# Patient Record
Sex: FEMALE | Race: ASIAN | NOT HISPANIC OR LATINO | URBAN - METROPOLITAN AREA
[De-identification: names, ages, dates, MRNs, and addresses within clinical notes are randomized per-mention and may not be internally consistent; named-entity substitution may affect disease eponyms.]

---

## 2019-12-04 ENCOUNTER — INPATIENT (INPATIENT)
Facility: HOSPITAL | Age: 39
LOS: 0 days | Discharge: HOME | DRG: 832 | End: 2019-12-05
Attending: OBSTETRICS & GYNECOLOGY | Admitting: OBSTETRICS & GYNECOLOGY
Payer: COMMERCIAL

## 2019-12-04 VITALS — WEIGHT: 293 LBS | HEIGHT: 64 IN

## 2019-12-04 DIAGNOSIS — Z3A.00 WEEKS OF GESTATION OF PREGNANCY NOT SPECIFIED: ICD-10-CM

## 2019-12-04 DIAGNOSIS — O26.899 OTHER SPECIFIED PREGNANCY RELATED CONDITIONS, UNSPECIFIED TRIMESTER: ICD-10-CM

## 2019-12-04 DIAGNOSIS — Z34.80 ENCOUNTER FOR SUPERVISION OF OTHER NORMAL PREGNANCY, UNSPECIFIED TRIMESTER: ICD-10-CM

## 2019-12-04 LAB
BASOPHILS # BLD AUTO: 0.03 K/UL — SIGNIFICANT CHANGE UP (ref 0–0.2)
BASOPHILS NFR BLD AUTO: 0.4 % — SIGNIFICANT CHANGE UP (ref 0–2)
BLD GP AB SCN SERPL QL: NEGATIVE — SIGNIFICANT CHANGE UP
EOSINOPHIL # BLD AUTO: 0.07 K/UL — SIGNIFICANT CHANGE UP (ref 0–0.5)
EOSINOPHIL NFR BLD AUTO: 1 % — SIGNIFICANT CHANGE UP (ref 0–6)
HCT VFR BLD CALC: 38.5 % — SIGNIFICANT CHANGE UP (ref 34.5–45)
HGB BLD-MCNC: 13.5 G/DL — SIGNIFICANT CHANGE UP (ref 11.5–15.5)
IMM GRANULOCYTES NFR BLD AUTO: 1.9 % — HIGH (ref 0–1.5)
LYMPHOCYTES # BLD AUTO: 1.59 K/UL — SIGNIFICANT CHANGE UP (ref 1–3.3)
LYMPHOCYTES # BLD AUTO: 21.9 % — SIGNIFICANT CHANGE UP (ref 13–44)
MCHC RBC-ENTMCNC: 30.3 PG — SIGNIFICANT CHANGE UP (ref 27–34)
MCHC RBC-ENTMCNC: 35.1 GM/DL — SIGNIFICANT CHANGE UP (ref 32–36)
MCV RBC AUTO: 86.5 FL — SIGNIFICANT CHANGE UP (ref 80–100)
MONOCYTES # BLD AUTO: 0.86 K/UL — SIGNIFICANT CHANGE UP (ref 0–0.9)
MONOCYTES NFR BLD AUTO: 11.9 % — SIGNIFICANT CHANGE UP (ref 2–14)
NEUTROPHILS # BLD AUTO: 4.56 K/UL — SIGNIFICANT CHANGE UP (ref 1.8–7.4)
NEUTROPHILS NFR BLD AUTO: 62.9 % — SIGNIFICANT CHANGE UP (ref 43–77)
NRBC # BLD: 0 /100 WBCS — SIGNIFICANT CHANGE UP (ref 0–0)
PLATELET # BLD AUTO: 111 K/UL — LOW (ref 150–400)
RBC # BLD: 4.45 M/UL — SIGNIFICANT CHANGE UP (ref 3.8–5.2)
RBC # FLD: 13.6 % — SIGNIFICANT CHANGE UP (ref 10.3–14.5)
RH IG SCN BLD-IMP: POSITIVE — SIGNIFICANT CHANGE UP
RH IG SCN BLD-IMP: POSITIVE — SIGNIFICANT CHANGE UP
WBC # BLD: 7.25 K/UL — SIGNIFICANT CHANGE UP (ref 3.8–10.5)
WBC # FLD AUTO: 7.25 K/UL — SIGNIFICANT CHANGE UP (ref 3.8–10.5)

## 2019-12-04 RX ORDER — OXYTOCIN 10 UNIT/ML
333.33 VIAL (ML) INJECTION
Qty: 20 | Refills: 0 | Status: DISCONTINUED | OUTPATIENT
Start: 2019-12-04 | End: 2019-12-05

## 2019-12-04 RX ORDER — SODIUM CHLORIDE 9 MG/ML
1000 INJECTION, SOLUTION INTRAVENOUS
Refills: 0 | Status: DISCONTINUED | OUTPATIENT
Start: 2019-12-04 | End: 2019-12-05

## 2019-12-04 RX ORDER — CITRIC ACID/SODIUM CITRATE 300-500 MG
15 SOLUTION, ORAL ORAL EVERY 6 HOURS
Refills: 0 | Status: DISCONTINUED | OUTPATIENT
Start: 2019-12-04 | End: 2019-12-05

## 2019-12-04 RX ADMIN — SODIUM CHLORIDE 125 MILLILITER(S): 9 INJECTION, SOLUTION INTRAVENOUS at 19:45

## 2019-12-04 NOTE — PATIENT PROFILE OB - DOES PATIENT HAVE ADVANCE DIRECTIVE
What Type Of Note Output Would You Prefer (Optional)?: Standard Output How Severe Is Your Skin Lesion?: mild Is This A New Presentation, Or A Follow-Up?: Skin Lesion Yes

## 2019-12-05 ENCOUNTER — ASOB RESULT (OUTPATIENT)
Age: 39
End: 2019-12-05

## 2019-12-05 ENCOUNTER — APPOINTMENT (OUTPATIENT)
Dept: ANTEPARTUM | Facility: CLINIC | Age: 39
End: 2019-12-05

## 2019-12-05 ENCOUNTER — TRANSCRIPTION ENCOUNTER (OUTPATIENT)
Age: 39
End: 2019-12-05

## 2019-12-05 VITALS — HEIGHT: 64 IN

## 2019-12-05 LAB
HCT VFR BLD CALC: 38.2 % — SIGNIFICANT CHANGE UP (ref 34.5–45)
HGB BLD-MCNC: 12.9 G/DL — SIGNIFICANT CHANGE UP (ref 11.5–15.5)
MCHC RBC-ENTMCNC: 29.9 PG — SIGNIFICANT CHANGE UP (ref 27–34)
MCHC RBC-ENTMCNC: 33.8 GM/DL — SIGNIFICANT CHANGE UP (ref 32–36)
MCV RBC AUTO: 88.4 FL — SIGNIFICANT CHANGE UP (ref 80–100)
NRBC # BLD: 0 /100 WBCS — SIGNIFICANT CHANGE UP (ref 0–0)
PLATELET # BLD AUTO: 123 K/UL — LOW (ref 150–400)
RBC # BLD: 4.32 M/UL — SIGNIFICANT CHANGE UP (ref 3.8–5.2)
RBC # FLD: 13.9 % — SIGNIFICANT CHANGE UP (ref 10.3–14.5)
T PALLIDUM AB TITR SER: NEGATIVE — SIGNIFICANT CHANGE UP
WBC # BLD: 6.92 K/UL — SIGNIFICANT CHANGE UP (ref 3.8–10.5)
WBC # FLD AUTO: 6.92 K/UL — SIGNIFICANT CHANGE UP (ref 3.8–10.5)

## 2019-12-05 PROCEDURE — 76815 OB US LIMITED FETUS(S): CPT

## 2019-12-05 PROCEDURE — 76819 FETAL BIOPHYS PROFIL W/O NST: CPT

## 2019-12-05 PROCEDURE — 59025 FETAL NON-STRESS TEST: CPT

## 2019-12-05 PROCEDURE — 86780 TREPONEMA PALLIDUM: CPT

## 2019-12-05 PROCEDURE — 86850 RBC ANTIBODY SCREEN: CPT

## 2019-12-05 PROCEDURE — G0463: CPT

## 2019-12-05 PROCEDURE — 86901 BLOOD TYPING SEROLOGIC RH(D): CPT

## 2019-12-05 PROCEDURE — 76819 FETAL BIOPHYS PROFIL W/O NST: CPT | Mod: 26

## 2019-12-05 PROCEDURE — 86900 BLOOD TYPING SEROLOGIC ABO: CPT

## 2019-12-05 PROCEDURE — 76815 OB US LIMITED FETUS(S): CPT | Mod: 26

## 2019-12-05 PROCEDURE — 85027 COMPLETE CBC AUTOMATED: CPT

## 2019-12-05 RX ORDER — OXYTOCIN 10 UNIT/ML
4 VIAL (ML) INJECTION
Qty: 30 | Refills: 0 | Status: DISCONTINUED | OUTPATIENT
Start: 2019-12-05 | End: 2019-12-05

## 2019-12-05 RX ORDER — LEVOTHYROXINE SODIUM 125 MCG
50 TABLET ORAL DAILY
Refills: 0 | Status: DISCONTINUED | OUTPATIENT
Start: 2019-12-05 | End: 2019-12-05

## 2019-12-05 RX ADMIN — Medication 50 MICROGRAM(S): at 04:04

## 2019-12-05 NOTE — DISCHARGE NOTE OB - HOSPITAL COURSE
Pt. began IOL with oral cytotec without progression and upon re-evaluation was found not to meet criteria to continue IOL.  BPP was 8/8.  Therefore, after D/W MFM, pt to D/C home & await spontaneous labor.  If labor does not ensue, pt. to return to L&D on Sunday.

## 2019-12-05 NOTE — PRE-ANESTHESIA EVALUATION ADULT - NSANTHADDINFOFT_GEN_ALL_CORE
Plt. ct. reviewed.  Labor epidural explained in detail;  risks including but not limited to HA, failure, nv injury.  All questions answered.

## 2019-12-05 NOTE — DISCHARGE NOTE OB - CARE PROVIDER_API CALL
Jose Luis Umaña)  Obstetrics and Gynecology  [address] [place] [address]  Phone: [phone]  Fax: [phone]  Follow Up Time:

## 2019-12-05 NOTE — DISCHARGE NOTE OB - CARE PLAN
Principal Discharge DX:	40 weeks gestation of pregnancy  Goal:	resume prenatal care & aware labor  Assessment and plan of treatment:	Pt. not meeting criteria for IOL and therefore to cont. pregnancy to await labor.  If labor does not ensue, to return for IOL on Sunday.

## 2019-12-05 NOTE — DISCHARGE NOTE OB - PATIENT PORTAL LINK FT
You can access the FollowMyHealth Patient Portal offered by [facility] by registering at the following website: [email or web address]. By joining [facility]’s FollowMyHealth portal, you will also be able to view your health information using other applications (apps) compatible with our system.

## 2019-12-05 NOTE — DISCHARGE NOTE OB - PLAN OF CARE
resume prenatal care & aware labor Pt. not meeting criteria for IOL and therefore to cont. pregnancy to await labor.  If labor does not ensue, to return for IOL on Sunday.

## 2019-12-08 ENCOUNTER — INPATIENT (INPATIENT)
Facility: HOSPITAL | Age: 39
LOS: 2 days | Discharge: HOME | End: 2019-12-11
Attending: OBSTETRICS & GYNECOLOGY | Admitting: OBSTETRICS & GYNECOLOGY
Payer: COMMERCIAL

## 2019-12-08 VITALS — WEIGHT: 154.32 LBS | HEIGHT: 64 IN

## 2019-12-08 DIAGNOSIS — O48.0 POST-TERM PREGNANCY: ICD-10-CM

## 2019-12-08 LAB
BASOPHILS # BLD AUTO: 0.02 K/UL — SIGNIFICANT CHANGE UP (ref 0–0.2)
BASOPHILS NFR BLD AUTO: 0.3 % — SIGNIFICANT CHANGE UP (ref 0–2)
BLD GP AB SCN SERPL QL: NEGATIVE — SIGNIFICANT CHANGE UP
EOSINOPHIL # BLD AUTO: 0.07 K/UL — SIGNIFICANT CHANGE UP (ref 0–0.5)
EOSINOPHIL NFR BLD AUTO: 1 % — SIGNIFICANT CHANGE UP (ref 0–6)
HCT VFR BLD CALC: 37.4 % — SIGNIFICANT CHANGE UP (ref 34.5–45)
HGB BLD-MCNC: 13.1 G/DL — SIGNIFICANT CHANGE UP (ref 11.5–15.5)
IMM GRANULOCYTES NFR BLD AUTO: 1.7 % — HIGH (ref 0–1.5)
LYMPHOCYTES # BLD AUTO: 1.45 K/UL — SIGNIFICANT CHANGE UP (ref 1–3.3)
LYMPHOCYTES # BLD AUTO: 20.3 % — SIGNIFICANT CHANGE UP (ref 13–44)
MCHC RBC-ENTMCNC: 30.5 PG — SIGNIFICANT CHANGE UP (ref 27–34)
MCHC RBC-ENTMCNC: 35 GM/DL — SIGNIFICANT CHANGE UP (ref 32–36)
MCV RBC AUTO: 87.2 FL — SIGNIFICANT CHANGE UP (ref 80–100)
MONOCYTES # BLD AUTO: 0.81 K/UL — SIGNIFICANT CHANGE UP (ref 0–0.9)
MONOCYTES NFR BLD AUTO: 11.3 % — SIGNIFICANT CHANGE UP (ref 2–14)
NEUTROPHILS # BLD AUTO: 4.69 K/UL — SIGNIFICANT CHANGE UP (ref 1.8–7.4)
NEUTROPHILS NFR BLD AUTO: 65.4 % — SIGNIFICANT CHANGE UP (ref 43–77)
NRBC # BLD: 0 /100 WBCS — SIGNIFICANT CHANGE UP (ref 0–0)
PLATELET # BLD AUTO: 132 K/UL — LOW (ref 150–400)
RBC # BLD: 4.29 M/UL — SIGNIFICANT CHANGE UP (ref 3.8–5.2)
RBC # FLD: 13.5 % — SIGNIFICANT CHANGE UP (ref 10.3–14.5)
RH IG SCN BLD-IMP: POSITIVE — SIGNIFICANT CHANGE UP
WBC # BLD: 7.16 K/UL — SIGNIFICANT CHANGE UP (ref 3.8–10.5)
WBC # FLD AUTO: 7.16 K/UL — SIGNIFICANT CHANGE UP (ref 3.8–10.5)

## 2019-12-08 RX ORDER — OXYTOCIN 10 UNIT/ML
333.33 VIAL (ML) INJECTION
Qty: 20 | Refills: 0 | Status: DISCONTINUED | OUTPATIENT
Start: 2019-12-08 | End: 2019-12-09

## 2019-12-08 RX ORDER — SODIUM CHLORIDE 9 MG/ML
1000 INJECTION, SOLUTION INTRAVENOUS
Refills: 0 | Status: DISCONTINUED | OUTPATIENT
Start: 2019-12-08 | End: 2019-12-09

## 2019-12-08 RX ORDER — CITRIC ACID/SODIUM CITRATE 300-500 MG
15 SOLUTION, ORAL ORAL EVERY 6 HOURS
Refills: 0 | Status: DISCONTINUED | OUTPATIENT
Start: 2019-12-08 | End: 2019-12-09

## 2019-12-08 RX ADMIN — SODIUM CHLORIDE 125 MILLILITER(S): 9 INJECTION, SOLUTION INTRAVENOUS at 21:47

## 2019-12-08 RX ADMIN — SODIUM CHLORIDE 125 MILLILITER(S): 9 INJECTION, SOLUTION INTRAVENOUS at 21:18

## 2019-12-09 LAB — T PALLIDUM AB TITR SER: NEGATIVE — SIGNIFICANT CHANGE UP

## 2019-12-09 RX ORDER — PRAMOXINE HYDROCHLORIDE 150 MG/15G
1 AEROSOL, FOAM RECTAL EVERY 4 HOURS
Refills: 0 | Status: DISCONTINUED | OUTPATIENT
Start: 2019-12-09 | End: 2019-12-11

## 2019-12-09 RX ORDER — OXYCODONE HYDROCHLORIDE 5 MG/1
5 TABLET ORAL ONCE
Refills: 0 | Status: DISCONTINUED | OUTPATIENT
Start: 2019-12-09 | End: 2019-12-11

## 2019-12-09 RX ORDER — OXYTOCIN 10 UNIT/ML
2 VIAL (ML) INJECTION
Qty: 30 | Refills: 0 | Status: DISCONTINUED | OUTPATIENT
Start: 2019-12-09 | End: 2019-12-09

## 2019-12-09 RX ORDER — DIBUCAINE 1 %
1 OINTMENT (GRAM) RECTAL EVERY 6 HOURS
Refills: 0 | Status: DISCONTINUED | OUTPATIENT
Start: 2019-12-09 | End: 2019-12-11

## 2019-12-09 RX ORDER — DIPHENHYDRAMINE HCL 50 MG
25 CAPSULE ORAL EVERY 6 HOURS
Refills: 0 | Status: DISCONTINUED | OUTPATIENT
Start: 2019-12-09 | End: 2019-12-11

## 2019-12-09 RX ORDER — IBUPROFEN 200 MG
600 TABLET ORAL EVERY 6 HOURS
Refills: 0 | Status: COMPLETED | OUTPATIENT
Start: 2019-12-09 | End: 2020-11-06

## 2019-12-09 RX ORDER — MAGNESIUM HYDROXIDE 400 MG/1
30 TABLET, CHEWABLE ORAL
Refills: 0 | Status: DISCONTINUED | OUTPATIENT
Start: 2019-12-09 | End: 2019-12-11

## 2019-12-09 RX ORDER — SIMETHICONE 80 MG/1
80 TABLET, CHEWABLE ORAL EVERY 4 HOURS
Refills: 0 | Status: DISCONTINUED | OUTPATIENT
Start: 2019-12-09 | End: 2019-12-11

## 2019-12-09 RX ORDER — BENZOCAINE 10 %
1 GEL (GRAM) MUCOUS MEMBRANE EVERY 6 HOURS
Refills: 0 | Status: DISCONTINUED | OUTPATIENT
Start: 2019-12-09 | End: 2019-12-11

## 2019-12-09 RX ORDER — OXYTOCIN 10 UNIT/ML
333.33 VIAL (ML) INJECTION
Qty: 20 | Refills: 0 | Status: DISCONTINUED | OUTPATIENT
Start: 2019-12-09 | End: 2019-12-11

## 2019-12-09 RX ORDER — SODIUM CHLORIDE 9 MG/ML
500 INJECTION, SOLUTION INTRAVENOUS ONCE
Refills: 0 | Status: COMPLETED | OUTPATIENT
Start: 2019-12-09 | End: 2019-12-09

## 2019-12-09 RX ORDER — LEVOTHYROXINE SODIUM 125 MCG
50 TABLET ORAL DAILY
Refills: 0 | Status: DISCONTINUED | OUTPATIENT
Start: 2019-12-09 | End: 2019-12-11

## 2019-12-09 RX ORDER — OXYCODONE HYDROCHLORIDE 5 MG/1
5 TABLET ORAL
Refills: 0 | Status: DISCONTINUED | OUTPATIENT
Start: 2019-12-09 | End: 2019-12-11

## 2019-12-09 RX ORDER — LANOLIN
1 OINTMENT (GRAM) TOPICAL EVERY 6 HOURS
Refills: 0 | Status: DISCONTINUED | OUTPATIENT
Start: 2019-12-09 | End: 2019-12-11

## 2019-12-09 RX ORDER — TETANUS TOXOID, REDUCED DIPHTHERIA TOXOID AND ACELLULAR PERTUSSIS VACCINE, ADSORBED 5; 2.5; 8; 8; 2.5 [IU]/.5ML; [IU]/.5ML; UG/.5ML; UG/.5ML; UG/.5ML
0.5 SUSPENSION INTRAMUSCULAR ONCE
Refills: 0 | Status: DISCONTINUED | OUTPATIENT
Start: 2019-12-09 | End: 2019-12-11

## 2019-12-09 RX ORDER — KETOROLAC TROMETHAMINE 30 MG/ML
30 SYRINGE (ML) INJECTION ONCE
Refills: 0 | Status: DISCONTINUED | OUTPATIENT
Start: 2019-12-09 | End: 2019-12-09

## 2019-12-09 RX ORDER — HYDROCORTISONE 1 %
1 OINTMENT (GRAM) TOPICAL EVERY 6 HOURS
Refills: 0 | Status: DISCONTINUED | OUTPATIENT
Start: 2019-12-09 | End: 2019-12-11

## 2019-12-09 RX ORDER — AER TRAVELER 0.5 G/1
1 SOLUTION RECTAL; TOPICAL EVERY 4 HOURS
Refills: 0 | Status: DISCONTINUED | OUTPATIENT
Start: 2019-12-09 | End: 2019-12-11

## 2019-12-09 RX ORDER — SODIUM CHLORIDE 9 MG/ML
3 INJECTION INTRAMUSCULAR; INTRAVENOUS; SUBCUTANEOUS EVERY 8 HOURS
Refills: 0 | Status: DISCONTINUED | OUTPATIENT
Start: 2019-12-09 | End: 2019-12-11

## 2019-12-09 RX ORDER — ACETAMINOPHEN 500 MG
975 TABLET ORAL
Refills: 0 | Status: DISCONTINUED | OUTPATIENT
Start: 2019-12-09 | End: 2019-12-11

## 2019-12-09 RX ORDER — OXYTOCIN 10 UNIT/ML
2 VIAL (ML) INJECTION
Qty: 30 | Refills: 0 | Status: DISCONTINUED | OUTPATIENT
Start: 2019-12-09 | End: 2019-12-11

## 2019-12-09 RX ORDER — GLYCERIN ADULT
1 SUPPOSITORY, RECTAL RECTAL AT BEDTIME
Refills: 0 | Status: DISCONTINUED | OUTPATIENT
Start: 2019-12-09 | End: 2019-12-11

## 2019-12-09 RX ORDER — IBUPROFEN 200 MG
600 TABLET ORAL EVERY 6 HOURS
Refills: 0 | Status: DISCONTINUED | OUTPATIENT
Start: 2019-12-09 | End: 2019-12-11

## 2019-12-09 RX ORDER — SENNA PLUS 8.6 MG/1
2 TABLET ORAL AT BEDTIME
Refills: 0 | Status: DISCONTINUED | OUTPATIENT
Start: 2019-12-09 | End: 2019-12-11

## 2019-12-09 RX ADMIN — Medication 600 MILLIGRAM(S): at 23:44

## 2019-12-09 RX ADMIN — Medication 30 MILLIGRAM(S): at 17:10

## 2019-12-09 RX ADMIN — Medication 50 MICROGRAM(S): at 05:17

## 2019-12-09 RX ADMIN — Medication 1000 MILLIUNIT(S)/MIN: at 18:26

## 2019-12-09 RX ADMIN — Medication 975 MILLIGRAM(S): at 22:05

## 2019-12-09 RX ADMIN — SENNA PLUS 2 TABLET(S): 8.6 TABLET ORAL at 22:51

## 2019-12-09 RX ADMIN — Medication 975 MILLIGRAM(S): at 21:04

## 2019-12-09 RX ADMIN — SODIUM CHLORIDE 1000 MILLILITER(S): 9 INJECTION, SOLUTION INTRAVENOUS at 11:39

## 2019-12-09 RX ADMIN — Medication 2 MILLIUNIT(S)/MIN: at 09:15

## 2019-12-09 NOTE — PRE-ANESTHESIA EVALUATION ADULT - NSANTHPMHFT_GEN_ALL_CORE
hx/o gest. thrombocytopenia (current plt ct 132k)  MTHFR(heterozygous on genetic testing)  Hypothyroidism

## 2019-12-09 NOTE — PRE-ANESTHESIA EVALUATION ADULT - NSANTHADDINFOFT_GEN_ALL_CORE
Labor epidural explained in detail;  risks include but not limited to HA, failure, nv injury.  All questions answered.

## 2019-12-10 LAB
HCT VFR BLD CALC: 32.1 % — LOW (ref 34.5–45)
HGB BLD-MCNC: 10.6 G/DL — LOW (ref 11.5–15.5)

## 2019-12-10 RX ORDER — SENNA PLUS 8.6 MG/1
1 TABLET ORAL DAILY
Refills: 0 | Status: DISCONTINUED | OUTPATIENT
Start: 2019-12-10 | End: 2019-12-11

## 2019-12-10 RX ORDER — POLYETHYLENE GLYCOL 3350 17 G/17G
17 POWDER, FOR SOLUTION ORAL DAILY
Refills: 0 | Status: DISCONTINUED | OUTPATIENT
Start: 2019-12-10 | End: 2019-12-11

## 2019-12-10 RX ADMIN — MAGNESIUM HYDROXIDE 30 MILLILITER(S): 400 TABLET, CHEWABLE ORAL at 02:35

## 2019-12-10 RX ADMIN — Medication 975 MILLIGRAM(S): at 04:00

## 2019-12-10 RX ADMIN — Medication 600 MILLIGRAM(S): at 00:45

## 2019-12-10 RX ADMIN — SENNA PLUS 1 TABLET(S): 8.6 TABLET ORAL at 12:53

## 2019-12-10 RX ADMIN — SIMETHICONE 80 MILLIGRAM(S): 80 TABLET, CHEWABLE ORAL at 12:53

## 2019-12-10 RX ADMIN — Medication 975 MILLIGRAM(S): at 20:47

## 2019-12-10 RX ADMIN — Medication 975 MILLIGRAM(S): at 15:20

## 2019-12-10 RX ADMIN — Medication 50 MICROGRAM(S): at 04:45

## 2019-12-10 RX ADMIN — Medication 600 MILLIGRAM(S): at 13:30

## 2019-12-10 RX ADMIN — Medication 975 MILLIGRAM(S): at 21:17

## 2019-12-10 RX ADMIN — Medication 975 MILLIGRAM(S): at 09:42

## 2019-12-10 RX ADMIN — Medication 1 TABLET(S): at 12:53

## 2019-12-10 RX ADMIN — Medication 975 MILLIGRAM(S): at 15:46

## 2019-12-10 RX ADMIN — Medication 975 MILLIGRAM(S): at 10:20

## 2019-12-10 RX ADMIN — Medication 975 MILLIGRAM(S): at 03:03

## 2019-12-10 RX ADMIN — Medication 600 MILLIGRAM(S): at 18:53

## 2019-12-10 RX ADMIN — Medication 600 MILLIGRAM(S): at 12:53

## 2019-12-10 RX ADMIN — Medication 600 MILLIGRAM(S): at 06:30

## 2019-12-10 RX ADMIN — Medication 600 MILLIGRAM(S): at 05:32

## 2019-12-10 NOTE — PROGRESS NOTE ADULT - SUBJECTIVE AND OBJECTIVE BOX
OB Progress Note:  PPD#1    S: 40yo PPD#1 s/p . Patient feels well. Pain is well controlled. She is tolerating a regular diet and passing flatus. She is voiding spontaneously, and ambulating without difficulty. Endorses light vaginal bleeding, soaking less than 1 pad/hour. Denies CP/SOB. Denies lightheadedness/dizziness. Denies N/V.     O:  Vitals:  Vital Signs Last 24 Hrs  T(C): 36.5 (10 Dec 2019 01:00), Max: 36.6 (09 Dec 2019 20:45)  T(F): 97.7 (10 Dec 2019 01:00), Max: 97.9 (09 Dec 2019 20:45)  HR: 76 (10 Dec 2019 01:00) (70 - 96)  BP: 106/81 (10 Dec 2019 01:00) (106/81 - 144/64)  BP(mean): 92 (09 Dec 2019 18:55) (89 - 92)  RR: 18 (10 Dec 2019 01:00) (18 - 19)  SpO2: 99% (10 Dec 2019 01:00) (98% - 100%)    Physical Exam:  General: NAD  Heart: all extremities well perfused  Lungs: breathing comfortably  Abdomen: soft, non-tender, non-distended, fundus firm  Extremities: No calf tenderness or erythema    Labs:  Blood type: B Positive  Rubella IgG: RPR: Negative                          13.1   7.16 >-----------< 132<L>    ( 12-08 @ 21:27 )             37.4

## 2019-12-11 ENCOUNTER — TRANSCRIPTION ENCOUNTER (OUTPATIENT)
Age: 39
End: 2019-12-11

## 2019-12-11 VITALS
HEART RATE: 771 BPM | DIASTOLIC BLOOD PRESSURE: 79 MMHG | SYSTOLIC BLOOD PRESSURE: 109 MMHG | OXYGEN SATURATION: 99 % | TEMPERATURE: 98 F | RESPIRATION RATE: 18 BRPM

## 2019-12-11 PROCEDURE — 86850 RBC ANTIBODY SCREEN: CPT

## 2019-12-11 PROCEDURE — 86901 BLOOD TYPING SEROLOGIC RH(D): CPT

## 2019-12-11 PROCEDURE — 59025 FETAL NON-STRESS TEST: CPT

## 2019-12-11 PROCEDURE — 86780 TREPONEMA PALLIDUM: CPT

## 2019-12-11 PROCEDURE — 86900 BLOOD TYPING SEROLOGIC ABO: CPT

## 2019-12-11 PROCEDURE — 85018 HEMOGLOBIN: CPT

## 2019-12-11 PROCEDURE — 85027 COMPLETE CBC AUTOMATED: CPT

## 2019-12-11 PROCEDURE — 59050 FETAL MONITOR W/REPORT: CPT

## 2019-12-11 PROCEDURE — 85014 HEMATOCRIT: CPT

## 2019-12-11 RX ORDER — ACETAMINOPHEN 500 MG
3 TABLET ORAL
Qty: 0 | Refills: 0 | DISCHARGE
Start: 2019-12-11

## 2019-12-11 RX ORDER — IBUPROFEN 200 MG
1 TABLET ORAL
Qty: 0 | Refills: 0 | DISCHARGE
Start: 2019-12-11

## 2019-12-11 RX ADMIN — POLYETHYLENE GLYCOL 3350 17 GRAM(S): 17 POWDER, FOR SOLUTION ORAL at 00:39

## 2019-12-11 RX ADMIN — Medication 600 MILLIGRAM(S): at 06:30

## 2019-12-11 RX ADMIN — Medication 975 MILLIGRAM(S): at 09:50

## 2019-12-11 RX ADMIN — Medication 600 MILLIGRAM(S): at 00:53

## 2019-12-11 RX ADMIN — Medication 600 MILLIGRAM(S): at 00:23

## 2019-12-11 RX ADMIN — Medication 975 MILLIGRAM(S): at 09:18

## 2019-12-11 RX ADMIN — Medication 975 MILLIGRAM(S): at 03:10

## 2019-12-11 RX ADMIN — Medication 50 MICROGRAM(S): at 06:30

## 2019-12-11 RX ADMIN — Medication 600 MILLIGRAM(S): at 07:00

## 2019-12-11 RX ADMIN — Medication 975 MILLIGRAM(S): at 03:40

## 2019-12-11 NOTE — DISCHARGE NOTE OB - CARE PLAN
Principal Discharge DX:	Vaginal delivery  Goal:	home  Assessment and plan of treatment:	follow up in 6 weeks  Secondary Diagnosis:	Hypothyroid  Goal:	home  Assessment and plan of treatment:	continue home medication, follow up with endocrinology in 4-6 weeks

## 2019-12-11 NOTE — DISCHARGE NOTE OB - CARE PROVIDER_API CALL
Guadalupe Alcazar)  [facility] Obstetrics and Gynecology  [address] [place] [address]  Phone: [phone]  Fax: [phone]  Follow Up Time:

## 2019-12-11 NOTE — DISCHARGE NOTE OB - MEDICATION SUMMARY - MEDICATIONS TO TAKE
I will START or STAY ON the medications listed below when I get home from the hospital:    acetaminophen 325 mg oral tablet  -- 3 tab(s) by mouth   -- Indication: For mild pain    ibuprofen 600 mg oral tablet  -- 1 tab(s) by mouth every 6 hours  -- Indication: For moderate pain    Prenatal Multivitamins oral tablet  -- 1 tab(s) by mouth once a day  -- Indication: For Postpartum    Synthroid 50 mcg (0.05 mg) oral tablet  -- orally once a day  -- Indication: For home medication

## 2019-12-11 NOTE — PROGRESS NOTE ADULT - SUBJECTIVE AND OBJECTIVE BOX
[id number] KAMATH    Subjective:   Patient seen and examined at bedside, pain controled, tolerating regular diet. + ambulation/flatus/void.  Denies SOB, CP, Dizziness, minimal lochia.     T(C): 36.6 (12-11-19 @ 06:42), Max: 36.7 (12-10-19 @ 12:25)  HR: 771 (12-11-19 @ 06:42) (76 - 771)  BP: 109/79 (12-11-19 @ 06:42) (109/79 - 120/80)  RR: 18 (12-11-19 @ 06:42) (18 - 18)  SpO2: 99% (12-11-19 @ 06:42) (98% - 99%)    PE:   Gen: NAD  Chest: CTA b/l RRR  abd: soft nt nd, firm fundus below umbilicus  ext: + SCDs                          10.6   x     )-----------( x        ( 10 Dec 2019 08:56 )             32.1                         13.1   7.16  )-----------( 132      ( 08 Dec 2019 21:27 )             37.4           Assessment/Plan: 39y PPD 2 s/p VD doing well  VD: Regular diet, po analgesia, routine post partum care  DVT PPx -Ambulation

## 2019-12-11 NOTE — DISCHARGE NOTE OB - MATERIALS PROVIDED
Shaken Baby Prevention Handout/[facility] Hearing Screen Program/New Beginnings/Breastfeeding Mother’s Support Group Information/Birth Certificate Instructions/Back To Sleep Handout/[facility] [place] Screening Program

## 2019-12-11 NOTE — DISCHARGE NOTE OB - PATIENT PORTAL LINK FT
You can access the FollowMyHealth Patient Portal offered by [facility] by registering at the following website: [email or web address]. By joining [facility]’s FollowMyHealth portal, you will also be able to view your health information using other applications (apps) compatible with our system.

## 2020-01-20 ENCOUNTER — RESULT REVIEW (OUTPATIENT)
Age: 40
End: 2020-01-20

## 2021-02-16 ENCOUNTER — APPOINTMENT (OUTPATIENT)
Dept: OBGYN | Facility: CLINIC | Age: 41
End: 2021-02-16

## 2021-02-16 ENCOUNTER — ASOB RESULT (OUTPATIENT)
Age: 41
End: 2021-02-16

## 2021-02-16 ENCOUNTER — APPOINTMENT (OUTPATIENT)
Dept: OBGYN | Facility: CLINIC | Age: 41
End: 2021-02-16
Payer: COMMERCIAL

## 2021-02-16 PROCEDURE — 76830 TRANSVAGINAL US NON-OB: CPT

## 2021-02-16 PROCEDURE — 99072 ADDL SUPL MATRL&STAF TM PHE: CPT

## 2021-02-17 ENCOUNTER — NON-APPOINTMENT (OUTPATIENT)
Age: 41
End: 2021-02-17

## 2021-02-21 ENCOUNTER — NON-APPOINTMENT (OUTPATIENT)
Age: 41
End: 2021-02-21

## 2021-02-24 ENCOUNTER — NON-APPOINTMENT (OUTPATIENT)
Age: 41
End: 2021-02-24

## 2021-03-10 ENCOUNTER — APPOINTMENT (OUTPATIENT)
Dept: OBGYN | Facility: CLINIC | Age: 41
End: 2021-03-10
Payer: COMMERCIAL

## 2021-03-10 VITALS
WEIGHT: 128 LBS | DIASTOLIC BLOOD PRESSURE: 74 MMHG | SYSTOLIC BLOOD PRESSURE: 112 MMHG | HEIGHT: 63 IN | BODY MASS INDEX: 22.68 KG/M2

## 2021-03-10 DIAGNOSIS — Z00.00 ENCOUNTER FOR GENERAL ADULT MEDICAL EXAMINATION W/OUT ABNORMAL FINDINGS: ICD-10-CM

## 2021-03-10 DIAGNOSIS — E04.1 NONTOXIC SINGLE THYROID NODULE: ICD-10-CM

## 2021-03-10 DIAGNOSIS — N92.0 EXCESSIVE AND FREQUENT MENSTRUATION WITH REGULAR CYCLE: ICD-10-CM

## 2021-03-10 DIAGNOSIS — E03.9 HYPOTHYROIDISM, UNSPECIFIED: ICD-10-CM

## 2021-03-10 LAB
BILIRUB UR QL STRIP: NORMAL
CLARITY UR: CLEAR
COLLECTION METHOD: NORMAL
GLUCOSE UR-MCNC: NORMAL
HCG UR QL: 0.2 EU/DL
HGB UR QL STRIP.AUTO: NORMAL
KETONES UR-MCNC: NORMAL
LEUKOCYTE ESTERASE UR QL STRIP: NORMAL
NITRITE UR QL STRIP: NORMAL
PH UR STRIP: 7
PROT UR STRIP-MCNC: NORMAL
SP GR UR STRIP: 1.01

## 2021-03-10 PROCEDURE — 99396 PREV VISIT EST AGE 40-64: CPT

## 2021-03-10 PROCEDURE — 81003 URINALYSIS AUTO W/O SCOPE: CPT | Mod: QW

## 2021-03-10 PROCEDURE — 99212 OFFICE O/P EST SF 10 MIN: CPT | Mod: 25

## 2021-03-10 PROCEDURE — 99072 ADDL SUPL MATRL&STAF TM PHE: CPT

## 2021-03-10 RX ORDER — LEVOTHYROXINE SODIUM 0.05 MG/1
50 TABLET ORAL
Refills: 0 | Status: ACTIVE | COMMUNITY
Start: 2021-03-10

## 2021-03-11 LAB
C TRACH RRNA SPEC QL NAA+PROBE: NOT DETECTED
HPV HIGH+LOW RISK DNA PNL CVX: NOT DETECTED
N GONORRHOEA RRNA SPEC QL NAA+PROBE: NOT DETECTED
SOURCE AMPLIFICATION: NORMAL

## 2021-03-14 LAB — CYTOLOGY CVX/VAG DOC THIN PREP: NORMAL

## 2021-04-03 ENCOUNTER — NON-APPOINTMENT (OUTPATIENT)
Age: 41
End: 2021-04-03

## 2021-04-08 ENCOUNTER — NON-APPOINTMENT (OUTPATIENT)
Age: 41
End: 2021-04-08

## 2021-04-12 ENCOUNTER — NON-APPOINTMENT (OUTPATIENT)
Age: 41
End: 2021-04-12

## 2021-05-27 ENCOUNTER — APPOINTMENT (OUTPATIENT)
Dept: OBGYN | Facility: CLINIC | Age: 41
End: 2021-05-27

## 2022-03-15 ENCOUNTER — NON-APPOINTMENT (OUTPATIENT)
Age: 42
End: 2022-03-15

## 2022-03-16 ENCOUNTER — APPOINTMENT (OUTPATIENT)
Dept: OBGYN | Facility: CLINIC | Age: 42
End: 2022-03-16
Payer: COMMERCIAL

## 2022-03-16 VITALS
SYSTOLIC BLOOD PRESSURE: 125 MMHG | HEIGHT: 63 IN | DIASTOLIC BLOOD PRESSURE: 85 MMHG | BODY MASS INDEX: 22.5 KG/M2 | WEIGHT: 127 LBS

## 2022-03-16 PROCEDURE — 99396 PREV VISIT EST AGE 40-64: CPT

## 2022-03-16 PROCEDURE — 82270 OCCULT BLOOD FECES: CPT

## 2022-03-17 LAB — HPV HIGH+LOW RISK DNA PNL CVX: NOT DETECTED

## 2022-03-22 LAB — CYTOLOGY CVX/VAG DOC THIN PREP: NORMAL

## 2022-04-18 NOTE — PATIENT PROFILE OB - NS PRO AD NO ADVANCE DIRECTIVE
Pop and Dr Dejesus aware  
No

## 2022-07-26 DIAGNOSIS — D21.9 BENIGN NEOPLASM OF CONNECTIVE AND OTHER SOFT TISSUE, UNSPECIFIED: ICD-10-CM

## 2022-07-29 DIAGNOSIS — N63.0 UNSPECIFIED LUMP IN UNSPECIFIED BREAST: ICD-10-CM

## 2022-08-01 ENCOUNTER — NON-APPOINTMENT (OUTPATIENT)
Age: 42
End: 2022-08-01

## 2022-08-12 ENCOUNTER — NON-APPOINTMENT (OUTPATIENT)
Age: 42
End: 2022-08-12

## 2022-08-12 DIAGNOSIS — N84.0 POLYP OF CORPUS UTERI: ICD-10-CM

## 2022-08-18 ENCOUNTER — NON-APPOINTMENT (OUTPATIENT)
Age: 42
End: 2022-08-18

## 2022-08-22 ENCOUNTER — APPOINTMENT (OUTPATIENT)
Dept: OBGYN | Facility: CLINIC | Age: 42
End: 2022-08-22

## 2022-08-22 PROCEDURE — 99441: CPT | Mod: 95

## 2022-09-08 ENCOUNTER — APPOINTMENT (OUTPATIENT)
Dept: OBGYN | Facility: CLINIC | Age: 42
End: 2022-09-08

## 2022-09-09 NOTE — PATIENT PROFILE OB - LIVING CHILDREN, OB PROFILE
Hospitalist Progress Note  9/9/2022 8:19 AM    PCP: No primary care provider on file.    [id number]     Date of Admission: 8/31/2022                                                                                                                     HOSPITAL COURSE    Patient demographics:  The patient  Lucy Sapp is a 76 y.o. male     Significant past medical history:   Patient Active Problem List   Diagnosis    Chest pain    MI, acute, non ST segment elevation ([address]    Tobacco abuse    Essential hypertension    Acute MI ([address]    Acute CVA (cerebrovascular accident) ([address]    Right leg weakness    Acute ischemic left [place] stroke ([address]    Non-healing open wound of right heel    Right heel infection    Hyperlipidemia    CAD (coronary artery disease)    Cellulitis of left heel    Wound infection    Acute hematogenous osteomyelitis ([address]    Right hemiparesis (HCC)    Severe malnutrition ([address]    Facial weakness    H/O: stroke         Presenting symptoms:  Left-sided facial droop    Diagnostic workup:      CONSULTS DURING ADMISSION :   IP CONSULT TO STROKE TEAM  IP CONSULT TO PHARMACY  PHARMACY TO CHANGE BASE FLUIDS  IP CONSULT TO HOSPITALIST  IP CONSULT TO NEUROLOGY  IP CONSULT TO CASE MANAGEMENT  IP CONSULT TO NEPHROLOGY  IP CONSULT TO PULMONOLOGY  PHARMACY TO DOSE VANCOMYCIN      Patient was diagnosed with:  Left-sided facial droop, rule out CVA  History of prior CVAs   Elevated troponin,   MECHE  CAD  Hypertension  Hyperlipidemia      Treatment while inpatient:  Patient is a 80-year-old male with past medical history of CAD hypertension hyperlipidemia     who presents to the hospital due to left-sided facial droop. Patient already has right-sided weakness from previous stroke. MRI of the brain showed multiple areas of stroke but no acute evidence of stroke. Pt was also diagnosed with MECHE. Which improved with IVF. ----------------------------------------------------------      SUBJECTIVE COMPLAINTS- follow up for Left-sided facial droop    Diet: ADULT DIET; Regular; Low Fat/Low Chol/High Fiber/AYAKA      OBJECTIVE:   Patient Active Problem List   Diagnosis    Chest pain    MI, acute, non ST segment elevation (HCC)    Tobacco abuse    Essential hypertension    Acute MI ([facility] [address]    Acute CVA (cerebrovascular accident) ([facility] [address]    Right leg weakness    Acute ischemic left [place] stroke (HCC)    Non-healing open wound of right heel    Right heel infection    Hyperlipidemia    CAD (coronary artery disease)    Cellulitis of left heel    Wound infection    Acute hematogenous osteomyelitis (HCC)    Right hemiparesis (HCC)    Severe malnutrition (HCC)    Facial weakness    H/O: stroke       Allergies  Patient has no known allergies.     Medications    Scheduled Meds:   vancomycin  750 mg IntraVENous Q12H    cefepime  2,000 mg IntraVENous Q8H    vancomycin (VANCOCIN) intermittent dosing (placeholder)   Other RX Placeholder    [Held by provider] lisinopril  10 mg Oral Daily    gabapentin  300 mg Oral TID    sertraline  100 mg Oral Daily    sodium chloride flush  10 mL IntraVENous 2 times per day    enoxaparin  40 mg SubCUTAneous Nightly    aspirin  81 mg Oral Daily    Or    aspirin  300 mg Rectal Daily    vitamin C  500 mg Oral Daily    atenolol  25 mg Oral Daily    atorvastatin  80 mg Oral Nightly    baclofen  20 mg Oral BID    busPIRone  5 mg Oral BID    clopidogrel  75 mg Oral Daily    ferrous sulfate  324 mg Oral Daily with breakfast    [Held by provider] hydroCHLOROthiazide  25 mg Oral Daily    mirtazapine  15 mg Oral Nightly    therapeutic multivitamin-minerals  1 tablet Oral Daily    pantoprazole  40 mg Oral Daily    senna  1 tablet Oral Daily     Continuous Infusions:   sodium chloride 25 mL (09/02/22 2043)     PRN Meds:  acetaminophen, sodium chloride, albuterol sulfate HFA, hydrALAZINE, albuterol, sodium chloride flush, sodium chloride, ondansetron **OR** ondansetron, nitroGLYCERIN    Vitals   Vitals /wt Patient Vitals for the past 8 hrs:   BP Temp Temp src Pulse Resp SpO2 Weight   09/09/22 0730 (!) 94/48 -- -- 57 16 92 % --   09/09/22 0518 -- -- -- -- -- -- 208 lb 5.4 oz (94.5 kg)   09/09/22 0343 (!) 96/59 97.9 °F (36.6 °C) Oral 56 18 91 % --        72HR INTAKE/OUTPUT:    Intake/Output Summary (Last 24 hours) at 9/9/2022 0819  Last data filed at 9/9/2022 0518  Gross per 24 hour   Intake 360 ml   Output 400 ml   Net -40 ml       Exam:    Gen:   Alert and oriented ×3    Eyes: PERRL. No sclera icterus. No conjunctival injection. ENT: No discharge. Pharynx clear. External appearance of ears and nose normal.  Neck: Trachea midline. No obvious mass. Resp: No accessory muscle use. No crackles. No wheezes. No rhonchi. CV: Regular rate. Regular rhythm. No murmur or rub. No edema. GI: Non-tender. Non-distended. No hernia. Skin: Warm, dry, normal texture and turgor. Lymph: No cervical LAD. No supraclavicular LAD. M/S: / Ext. 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 PT/INR:   No results for input(s): PROTIME, INR in the last 72 hours. APTT: No results for input(s): APTT in the last 72 hours. CBC:   Recent Labs     09/07/22  1404 09/08/22  0706 09/09/22 0439   WBC 16.6* 13.0* 10.0   HGB 11.1* 10.4* 10.0*   HCT 33.0* 30.3* 29.0*   MCV 86.9 85.6 85.8    138 150       BMP:   Recent Labs     09/08/22  0706 09/09/22 0439    141   K 4.0 4.2    106   CO2 21 20*   BUN 36* 50*   CREATININE 1.3 1.4*       LIVER PROFILE:   No results for input(s): ALKPHOS, AST, ALT, ALB, BILIDIR, BILITOT, ALKPHOS in the last 72 hours. No results for input(s): AMYLASE in the last 72 hours. No results for input(s): LIPASE in the last 72 hours.     UA:  No results for input(s): NITRITE, LABCAST, WBCUA, RBCUA, MUCUS in the last 72 hours. TROPONIN:   No results for input(s): Tha Shorts in the last 72 hours. Lab Results   Component Value Date/Time    URRFLXCULT Not Indicated 05/28/2021 06:30 PM       No results for input(s): TSHREFLEX in the last 72 hours. No components found for: [id number]  POC GLUCOSE:    No results for input(s): POCGLU in the last 72 hours. No results for input(s): LABA1C in the last 72 hours. Lab Results   Component Value Date/Time    LABA1C 5.2 09/01/2022 05:23 AM      Normal left ventricle size and systolic function with an estimated ejection   fraction of 55%. (8/31/2022)    ASSESSMENT AND PLAN    Pneumonia  Healthcare associated pneumonia  Left-sided pleural effusion  Pulmonary is considering thoracentesis if needed  WBC count is improving      Left-sided facial droop  MRI ruled out acute stroke  EEG was completed and it did show focal sharp epileptiform waves  Neurology is following  No plan for AED at this time  Continue aspirin Plavix and statin      Elevated troponin,   patient is chest pain-free      MECHE  Improved    CAD      Hypertension  Bp on the low side  Dc ivf  Resume lisinopril    Hyperlipidemia    Urinary tract infection  Continue with ceftriaxone  Check procalcitonin    PT/OT/speech therapy consult        Code Status: Full Code        Dispo - cc        The patient and / or the family were informed of the results of any tests, a time was given to answer questions, a plan was proposed and they agreed with plan. Adryan Mccrary MD    This note was transcribed using [id number]. Please disregard any translational errors. 1

## 2022-09-23 ENCOUNTER — OUTPATIENT (OUTPATIENT)
Dept: OUTPATIENT SERVICES | Facility: HOSPITAL | Age: 42
LOS: 1 days | End: 2022-09-23
Payer: COMMERCIAL

## 2022-09-23 VITALS
HEART RATE: 82 BPM | RESPIRATION RATE: 20 BRPM | SYSTOLIC BLOOD PRESSURE: 114 MMHG | OXYGEN SATURATION: 98 % | HEIGHT: 64 IN | TEMPERATURE: 98 F | WEIGHT: 126.99 LBS | DIASTOLIC BLOOD PRESSURE: 77 MMHG

## 2022-09-23 DIAGNOSIS — N84.0 POLYP OF CORPUS UTERI: ICD-10-CM

## 2022-09-23 DIAGNOSIS — Z01.818 ENCOUNTER FOR OTHER PREPROCEDURAL EXAMINATION: ICD-10-CM

## 2022-09-23 DIAGNOSIS — Z98.890 OTHER SPECIFIED POSTPROCEDURAL STATES: Chronic | ICD-10-CM

## 2022-09-23 LAB
BLD GP AB SCN SERPL QL: NEGATIVE — SIGNIFICANT CHANGE UP
HCT VFR BLD CALC: 37.8 % — SIGNIFICANT CHANGE UP (ref 34.5–45)
HGB BLD-MCNC: 12.4 G/DL — SIGNIFICANT CHANGE UP (ref 11.5–15.5)
MCHC RBC-ENTMCNC: 28.2 PG — SIGNIFICANT CHANGE UP (ref 27–34)
MCHC RBC-ENTMCNC: 32.8 GM/DL — SIGNIFICANT CHANGE UP (ref 32–36)
MCV RBC AUTO: 86.1 FL — SIGNIFICANT CHANGE UP (ref 80–100)
NRBC # BLD: 0 /100 WBCS — SIGNIFICANT CHANGE UP (ref 0–0)
PLATELET # BLD AUTO: 260 K/UL — SIGNIFICANT CHANGE UP (ref 150–400)
RBC # BLD: 4.39 M/UL — SIGNIFICANT CHANGE UP (ref 3.8–5.2)
RBC # FLD: 13.4 % — SIGNIFICANT CHANGE UP (ref 10.3–14.5)
RH IG SCN BLD-IMP: POSITIVE — SIGNIFICANT CHANGE UP
WBC # BLD: 4.06 K/UL — SIGNIFICANT CHANGE UP (ref 3.8–10.5)
WBC # FLD AUTO: 4.06 K/UL — SIGNIFICANT CHANGE UP (ref 3.8–10.5)

## 2022-09-23 PROCEDURE — 86850 RBC ANTIBODY SCREEN: CPT

## 2022-09-23 PROCEDURE — 85027 COMPLETE CBC AUTOMATED: CPT

## 2022-09-23 PROCEDURE — 86900 BLOOD TYPING SEROLOGIC ABO: CPT

## 2022-09-23 PROCEDURE — G0463: CPT

## 2022-09-23 PROCEDURE — 86901 BLOOD TYPING SEROLOGIC RH(D): CPT

## 2022-09-23 RX ORDER — LEVOTHYROXINE SODIUM 125 MCG
0 TABLET ORAL
Qty: 0 | Refills: 0 | DISCHARGE

## 2022-09-23 RX ORDER — SODIUM CHLORIDE 9 MG/ML
1000 INJECTION, SOLUTION INTRAVENOUS
Refills: 0 | Status: DISCONTINUED | OUTPATIENT
Start: 2022-10-14 | End: 2022-10-29

## 2022-09-23 NOTE — H&P PST ADULT - NSICDXPASTMEDICALHX_GEN_ALL_CORE_FT
PAST MEDICAL HISTORY:  Endometrial polyp     H/O thyroid nodule     Hypothyroidism     Menorrhagia with irregular cycle

## 2022-09-23 NOTE — H&P PST ADULT - HISTORY OF PRESENT ILLNESS
42 yr old female       Denies Recent travel, Exposure or Covid symptoms  Covid test   42 yr old female with history of hypothyroidism, with c/o menorrhagia with regular cycle for 2 years , found to have endometrial polyp. Now coming in for Exam under anesthesia , D & C, Operative hysteroscopy, Endometrial polyp removal on 10/14/2022.       Denies Recent travel, Exposure or Covid symptoms  Covid test- 10/11/2022

## 2022-09-23 NOTE — H&P PST ADULT - NSANTHOSAYNRD_GEN_A_CORE
No. GABBIE screening performed.  STOP BANG Legend: 0-2 = LOW Risk; 3-4 = INTERMEDIATE Risk; 5-8 = HIGH Risk

## 2022-10-11 ENCOUNTER — OUTPATIENT (OUTPATIENT)
Dept: OUTPATIENT SERVICES | Facility: HOSPITAL | Age: 42
LOS: 1 days | End: 2022-10-11
Payer: COMMERCIAL

## 2022-10-11 DIAGNOSIS — Z98.890 OTHER SPECIFIED POSTPROCEDURAL STATES: Chronic | ICD-10-CM

## 2022-10-11 DIAGNOSIS — Z11.52 ENCOUNTER FOR SCREENING FOR COVID-19: ICD-10-CM

## 2022-10-11 LAB — SARS-COV-2 RNA SPEC QL NAA+PROBE: SIGNIFICANT CHANGE UP

## 2022-10-11 PROCEDURE — C9803: CPT

## 2022-10-11 PROCEDURE — U0005: CPT

## 2022-10-11 PROCEDURE — U0003: CPT

## 2022-10-13 ENCOUNTER — TRANSCRIPTION ENCOUNTER (OUTPATIENT)
Age: 42
End: 2022-10-13

## 2022-10-14 ENCOUNTER — TRANSCRIPTION ENCOUNTER (OUTPATIENT)
Age: 42
End: 2022-10-14

## 2022-10-14 ENCOUNTER — RESULT REVIEW (OUTPATIENT)
Age: 42
End: 2022-10-14

## 2022-10-14 ENCOUNTER — APPOINTMENT (OUTPATIENT)
Dept: OBGYN | Facility: CLINIC | Age: 42
End: 2022-10-14

## 2022-10-14 ENCOUNTER — OUTPATIENT (OUTPATIENT)
Dept: OUTPATIENT SERVICES | Facility: HOSPITAL | Age: 42
LOS: 1 days | End: 2022-10-14
Payer: COMMERCIAL

## 2022-10-14 VITALS
SYSTOLIC BLOOD PRESSURE: 114 MMHG | WEIGHT: 126.99 LBS | HEART RATE: 82 BPM | DIASTOLIC BLOOD PRESSURE: 77 MMHG | RESPIRATION RATE: 20 BRPM | OXYGEN SATURATION: 98 % | HEIGHT: 64 IN | TEMPERATURE: 98 F

## 2022-10-14 VITALS
SYSTOLIC BLOOD PRESSURE: 123 MMHG | TEMPERATURE: 97 F | RESPIRATION RATE: 15 BRPM | OXYGEN SATURATION: 100 % | DIASTOLIC BLOOD PRESSURE: 84 MMHG | HEART RATE: 68 BPM

## 2022-10-14 DIAGNOSIS — N84.0 POLYP OF CORPUS UTERI: ICD-10-CM

## 2022-10-14 DIAGNOSIS — Z98.890 OTHER SPECIFIED POSTPROCEDURAL STATES: Chronic | ICD-10-CM

## 2022-10-14 PROCEDURE — ZZZZZ: CPT

## 2022-10-14 PROCEDURE — 58301 REMOVE INTRAUTERINE DEVICE: CPT

## 2022-10-14 PROCEDURE — 58561 HYSTEROSCOPY REMOVE MYOMA: CPT

## 2022-10-14 PROCEDURE — 58300 INSERT INTRAUTERINE DEVICE: CPT

## 2022-10-14 PROCEDURE — 88305 TISSUE EXAM BY PATHOLOGIST: CPT

## 2022-10-14 PROCEDURE — 88305 TISSUE EXAM BY PATHOLOGIST: CPT | Mod: 26

## 2022-10-14 PROCEDURE — 58558 HYSTEROSCOPY BIOPSY: CPT

## 2022-10-14 RX ORDER — LIDOCAINE HCL 20 MG/ML
0.2 VIAL (ML) INJECTION ONCE
Refills: 0 | Status: COMPLETED | OUTPATIENT
Start: 2022-10-14 | End: 2022-10-14

## 2022-10-14 RX ORDER — FENTANYL CITRATE 50 UG/ML
25 INJECTION INTRAVENOUS
Refills: 0 | Status: DISCONTINUED | OUTPATIENT
Start: 2022-10-14 | End: 2022-10-14

## 2022-10-14 RX ADMIN — SODIUM CHLORIDE 100 MILLILITER(S): 9 INJECTION, SOLUTION INTRAVENOUS at 14:06

## 2022-10-14 NOTE — BRIEF OPERATIVE NOTE - NSICDXBRIEFPOSTOP_GEN_ALL_CORE_FT
POST-OP DIAGNOSIS:  Abnormal uterine bleeding due to endometrial polyp 14-Oct-2022 18:07:32  Milena Calvo

## 2022-10-14 NOTE — BRIEF OPERATIVE NOTE - NSICDXBRIEFPREOP_GEN_ALL_CORE_FT
PRE-OP DIAGNOSIS:  Heavy menses 14-Oct-2022 18:07:05  Milena Calvo  Endometrial polyp 14-Oct-2022 18:07:17  Milena Calvo

## 2022-10-14 NOTE — BRIEF OPERATIVE NOTE - NSICDXBRIEFPROCEDURE_GEN_ALL_CORE_FT
PROCEDURES:  Hysteroscopy with biopsy or polypectomy 14-Oct-2022 18:06:20  Milena Calvo  Dilation and curettage with polypectomy 14-Oct-2022 18:06:51  Milena Calvo

## 2022-10-14 NOTE — ASU DISCHARGE PLAN (ADULT/PEDIATRIC) - NURSING INSTRUCTIONS
You received a dose of Tylenol today at 5:10 PM. If needed, next dose time is 11:10 PM this evening. Do not exceed 4,000 mg of Tylenol in a 24 hour period. You received a dose of Toradol (motrin or ibuprofen) today at 5:30 PM. If needed, next dose time is 11:30 PM this evening. Please eat before taking this medication.

## 2022-10-14 NOTE — ASU PATIENT PROFILE, ADULT - FALL HARM RISK - UNIVERSAL INTERVENTIONS
suspected Bed in lowest position, wheels locked, appropriate side rails in place/Call bell, personal items and telephone in reach/Instruct patient to call for assistance before getting out of bed or chair/Non-slip footwear when patient is out of bed/[place] to call system/Physically safe environment - no spills, clutter or unnecessary equipment/Purposeful Proactive Rounding/Room/bathroom lighting operational, light cord in reach

## 2022-10-14 NOTE — PACU DISCHARGE NOTE - NAUSEA/VOMITING:
Phone call with patient to inform her of lab results. Patient relieved overall labs look normal. She mentioned that at her office visit she discussed some bruises on her legs that have now turned into lumps. One has been there a few months and that is no longer painful but there is a noticeable lump on her leg. The other one developed about a month ago and that one is painful like a bruise. Now that her labs are overall normal, she is wondering if Dr. Mckinney is concerned or has any other recommendations for her as the patient herself seems concerned. Explained I will route to Dr. Mckinney for any further recommendations and call her back.    Msg to MD: please advise on any further recommendations for patient regarding the bruises that have no formed lumps on her lower extremities   None

## 2022-10-14 NOTE — ASU DISCHARGE PLAN (ADULT/PEDIATRIC) - NS MD DC FALL RISK RISK
For information on Fall & Injury Prevention, visit: [email or web address] OR  [email or web address] OR  https://www.cdc.gov/steadi/patient.html

## 2022-10-19 ENCOUNTER — APPOINTMENT (OUTPATIENT)
Dept: OBGYN | Facility: CLINIC | Age: 42
End: 2022-10-19

## 2022-10-19 LAB — SURGICAL PATHOLOGY STUDY: SIGNIFICANT CHANGE UP

## 2022-10-21 ENCOUNTER — APPOINTMENT (OUTPATIENT)
Dept: OBGYN | Facility: CLINIC | Age: 42
End: 2022-10-21

## 2022-10-21 VITALS — SYSTOLIC BLOOD PRESSURE: 118 MMHG | DIASTOLIC BLOOD PRESSURE: 75 MMHG

## 2022-10-21 DIAGNOSIS — Z09 ENCOUNTER FOR FOLLOW-UP EXAMINATION AFTER COMPLETED TREATMENT FOR CONDITIONS OTHER THAN MALIGNANT NEOPLASM: ICD-10-CM

## 2022-10-21 PROBLEM — Z86.39 PERSONAL HISTORY OF OTHER ENDOCRINE, NUTRITIONAL AND METABOLIC DISEASE: Chronic | Status: ACTIVE | Noted: 2022-09-23

## 2022-10-21 PROBLEM — N92.1 EXCESSIVE AND FREQUENT MENSTRUATION WITH IRREGULAR CYCLE: Chronic | Status: ACTIVE | Noted: 2022-09-23

## 2022-10-21 PROBLEM — E03.9 HYPOTHYROIDISM, UNSPECIFIED: Chronic | Status: ACTIVE | Noted: 2022-09-23

## 2022-10-21 PROCEDURE — 99213 OFFICE O/P EST LOW 20 MIN: CPT

## 2022-10-24 ENCOUNTER — NON-APPOINTMENT (OUTPATIENT)
Age: 42
End: 2022-10-24

## 2023-03-24 ENCOUNTER — APPOINTMENT (OUTPATIENT)
Dept: OBGYN | Facility: CLINIC | Age: 43
End: 2023-03-24
Payer: COMMERCIAL

## 2023-03-24 VITALS
DIASTOLIC BLOOD PRESSURE: 77 MMHG | WEIGHT: 127 LBS | SYSTOLIC BLOOD PRESSURE: 126 MMHG | BODY MASS INDEX: 22.5 KG/M2 | HEIGHT: 63 IN

## 2023-03-24 DIAGNOSIS — Z30.011 ENCOUNTER FOR INITIAL PRESCRIPTION OF CONTRACEPTIVE PILLS: ICD-10-CM

## 2023-03-24 PROCEDURE — 82270 OCCULT BLOOD FECES: CPT

## 2023-03-24 PROCEDURE — 99396 PREV VISIT EST AGE 40-64: CPT

## 2023-03-24 RX ORDER — NORETHINDRONE ACETATE AND ETHINYL ESTRADIOL AND FERROUS FUMARATE 1MG-20(21)
1-20 KIT ORAL DAILY
Qty: 3 | Refills: 3 | Status: ACTIVE | COMMUNITY
Start: 2023-03-24 | End: 1900-01-01

## 2023-03-24 RX ORDER — LEVOTHYROXINE SODIUM 0.05 MG/1
50 TABLET ORAL
Qty: 30 | Refills: 1 | Status: ACTIVE | COMMUNITY
Start: 2023-03-24 | End: 1900-01-01

## 2023-03-26 LAB — HPV HIGH+LOW RISK DNA PNL CVX: NOT DETECTED

## 2023-03-29 LAB — CYTOLOGY CVX/VAG DOC THIN PREP: NORMAL

## 2023-05-03 ENCOUNTER — NON-APPOINTMENT (OUTPATIENT)
Age: 43
End: 2023-05-03

## 2023-05-31 ENCOUNTER — NON-APPOINTMENT (OUTPATIENT)
Age: 43
End: 2023-05-31

## 2023-07-28 DIAGNOSIS — Z12.39 ENCOUNTER FOR OTHER SCREENING FOR MALIGNANT NEOPLASM OF BREAST: ICD-10-CM

## 2023-07-28 DIAGNOSIS — R92.2 INCONCLUSIVE MAMMOGRAM: ICD-10-CM

## 2023-09-21 ENCOUNTER — APPOINTMENT (OUTPATIENT)
Dept: OBGYN | Facility: CLINIC | Age: 43
End: 2023-09-21

## 2023-09-27 ENCOUNTER — APPOINTMENT (OUTPATIENT)
Dept: OBGYN | Facility: CLINIC | Age: 43
End: 2023-09-27
Payer: COMMERCIAL

## 2023-09-27 ENCOUNTER — ASOB RESULT (OUTPATIENT)
Age: 43
End: 2023-09-27

## 2023-09-27 DIAGNOSIS — R93.89 ABNORMAL FINDINGS ON DIAGNOSTIC IMAGING OF OTHER SPECIFIED BODY STRUCTURES: ICD-10-CM

## 2023-09-27 PROCEDURE — 76831 ECHO EXAM UTERUS: CPT

## 2023-09-27 PROCEDURE — ZZZZZ: CPT

## 2023-09-27 PROCEDURE — 81025 URINE PREGNANCY TEST: CPT

## 2023-09-27 PROCEDURE — 58340 CATHETER FOR HYSTEROGRAPHY: CPT

## 2023-09-29 ENCOUNTER — APPOINTMENT (OUTPATIENT)
Dept: OBGYN | Facility: CLINIC | Age: 43
End: 2023-09-29
Payer: COMMERCIAL

## 2023-09-29 ENCOUNTER — ASOB RESULT (OUTPATIENT)
Age: 43
End: 2023-09-29

## 2023-09-29 PROCEDURE — 76856 US EXAM PELVIC COMPLETE: CPT | Mod: 59

## 2023-09-29 PROCEDURE — 76830 TRANSVAGINAL US NON-OB: CPT

## 2024-01-12 ENCOUNTER — OUTPATIENT (OUTPATIENT)
Dept: OUTPATIENT SERVICES | Facility: HOSPITAL | Age: 44
LOS: 1 days | End: 2024-01-12
Payer: COMMERCIAL

## 2024-01-12 VITALS
RESPIRATION RATE: 16 BRPM | OXYGEN SATURATION: 100 % | HEART RATE: 61 BPM | DIASTOLIC BLOOD PRESSURE: 88 MMHG | HEIGHT: 64 IN | SYSTOLIC BLOOD PRESSURE: 129 MMHG | TEMPERATURE: 98 F | WEIGHT: 125 LBS

## 2024-01-12 DIAGNOSIS — Z01.818 ENCOUNTER FOR OTHER PREPROCEDURAL EXAMINATION: ICD-10-CM

## 2024-01-12 DIAGNOSIS — N84.0 POLYP OF CORPUS UTERI: ICD-10-CM

## 2024-01-12 DIAGNOSIS — Z98.890 OTHER SPECIFIED POSTPROCEDURAL STATES: Chronic | ICD-10-CM

## 2024-01-12 LAB
BLD GP AB SCN SERPL QL: NEGATIVE — SIGNIFICANT CHANGE UP
BLD GP AB SCN SERPL QL: NEGATIVE — SIGNIFICANT CHANGE UP
HCT VFR BLD CALC: 38.8 % — SIGNIFICANT CHANGE UP (ref 34.5–45)
HCT VFR BLD CALC: 38.8 % — SIGNIFICANT CHANGE UP (ref 34.5–45)
HGB BLD-MCNC: 12.7 G/DL — SIGNIFICANT CHANGE UP (ref 11.5–15.5)
HGB BLD-MCNC: 12.7 G/DL — SIGNIFICANT CHANGE UP (ref 11.5–15.5)
MCHC RBC-ENTMCNC: 28.2 PG — SIGNIFICANT CHANGE UP (ref 27–34)
MCHC RBC-ENTMCNC: 28.2 PG — SIGNIFICANT CHANGE UP (ref 27–34)
MCHC RBC-ENTMCNC: 32.7 GM/DL — SIGNIFICANT CHANGE UP (ref 32–36)
MCHC RBC-ENTMCNC: 32.7 GM/DL — SIGNIFICANT CHANGE UP (ref 32–36)
MCV RBC AUTO: 86 FL — SIGNIFICANT CHANGE UP (ref 80–100)
MCV RBC AUTO: 86 FL — SIGNIFICANT CHANGE UP (ref 80–100)
NRBC # BLD: 0 /100 WBCS — SIGNIFICANT CHANGE UP (ref 0–0)
NRBC # BLD: 0 /100 WBCS — SIGNIFICANT CHANGE UP (ref 0–0)
PLATELET # BLD AUTO: 301 K/UL — SIGNIFICANT CHANGE UP (ref 150–400)
PLATELET # BLD AUTO: 301 K/UL — SIGNIFICANT CHANGE UP (ref 150–400)
RBC # BLD: 4.51 M/UL — SIGNIFICANT CHANGE UP (ref 3.8–5.2)
RBC # BLD: 4.51 M/UL — SIGNIFICANT CHANGE UP (ref 3.8–5.2)
RBC # FLD: 13.2 % — SIGNIFICANT CHANGE UP (ref 10.3–14.5)
RBC # FLD: 13.2 % — SIGNIFICANT CHANGE UP (ref 10.3–14.5)
RH IG SCN BLD-IMP: POSITIVE — SIGNIFICANT CHANGE UP
RH IG SCN BLD-IMP: POSITIVE — SIGNIFICANT CHANGE UP
WBC # BLD: 5.33 K/UL — SIGNIFICANT CHANGE UP (ref 3.8–10.5)
WBC # BLD: 5.33 K/UL — SIGNIFICANT CHANGE UP (ref 3.8–10.5)
WBC # FLD AUTO: 5.33 K/UL — SIGNIFICANT CHANGE UP (ref 3.8–10.5)
WBC # FLD AUTO: 5.33 K/UL — SIGNIFICANT CHANGE UP (ref 3.8–10.5)

## 2024-01-12 PROCEDURE — 86900 BLOOD TYPING SEROLOGIC ABO: CPT

## 2024-01-12 PROCEDURE — 85027 COMPLETE CBC AUTOMATED: CPT

## 2024-01-12 PROCEDURE — G0463: CPT

## 2024-01-12 PROCEDURE — 86901 BLOOD TYPING SEROLOGIC RH(D): CPT

## 2024-01-12 PROCEDURE — 86850 RBC ANTIBODY SCREEN: CPT

## 2024-01-12 RX ORDER — SODIUM CHLORIDE 9 MG/ML
1000 INJECTION, SOLUTION INTRAVENOUS
Refills: 0 | Status: DISCONTINUED | OUTPATIENT
Start: 2024-05-08 | End: 2024-05-22

## 2024-01-12 RX ORDER — CHOLECALCIFEROL (VITAMIN D3) 125 MCG
1 CAPSULE ORAL
Qty: 0 | Refills: 0 | DISCHARGE

## 2024-01-12 NOTE — H&P PST ADULT - PROBLEM SELECTOR PLAN 1
Pt is scheduled for a D&C, operative hysteroscopy and removal of endometrial polyp with aveta on 1/24/24 with Dr. Calvo at the ambulatory care center  CBC and T/S ordered and obtained at PST  ABO, urine HCG on admit

## 2024-01-12 NOTE — H&P PST ADULT - NSICDXPASTMEDICALHX_GEN_ALL_CORE_FT
PAST MEDICAL HISTORY:  H/O thyroid nodule     H/O vitamin D deficiency     Hypothyroidism     Iron deficiency anemia     Menorrhagia with irregular cycle     Polyp of corpus uteri

## 2024-01-12 NOTE — H&P PST ADULT - NSICDXPASTSURGICALHX_GEN_ALL_CORE_FT
PAST SURGICAL HISTORY:  History of D&C      (normal spontaneous vaginal delivery)     Status post hysteroscopic polypectomy

## 2024-01-12 NOTE — H&P PST ADULT - ASSESSMENT
DASI score: 9.89  DASI activity: Able to walk 2-3 blocks, ADLs, Grocery Shopping, 1 Flight of Stairs, plays tennis  Loose teeth or denture: denies DASI score: 9.89  DASI activity: Able to walk 2-3 blocks, ADLs, Grocery Shopping, 1 Flight of Stairs, plays tennis weekly  Loose teeth or denture: denies

## 2024-01-12 NOTE — H&P PST ADULT - GENITOURINARY
2017    Referring Provider: Arlen Munoz MD    Presenting Information:   I met with Randa Moreno today for genetic counseling at the Cancer Risk Management Program at the [facility] to discuss her personal history of ovarian and endometrial cancer  and family history of colon, breast, uterine and melanoma cancer.  She is here today to review this history, cancer screening recommendations, and available genetic testing options.    Personal History:  Randa is a 41 year old female.  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  Her treatment included surgery (removal of her uterus, fallopian tubes and both ovaries) and chemotherapy.   She reported that her most recent  clinical breast exam was within the last year. Her most recent mammogram was at age 39.  She had a colonoscopy in  that was normal.  She is scheduled to have another colonoscopy next week.     Randa  had her first menstrual period at age 18 and has not had children.      Family History: (Please see scanned pedigree for detailed family history information)    Her brother was diagnosed with colon cancer at age 23 and passed away at age 24. It is not clear if he had genetic testing for Teague syndrome or not.  I asked Randa to have her mother sign a medical release form so that I could request the records from [facility].     Her mother was diagnosed with breast cancer in her 50's. Her treatment included radiation and a lumpectomy. She had a hysterectomy and removal of her ovaries (TAHBSO) in her 40's, reason unknown.  Her mother is now 66 years old.    Her maternal aunt  of uterine cancer in her 60's.     Another maternal aunt was diagnosed with ovarian cancer in her 50's. She is now 69.    Another maternal aunt, age 62 was diagnosed with melanoma twice: once on her face and once on her neck.  This aunt was involved the family in a genetic testing for Teague  syndrome through a medical center in [place] but those results were not available at this appointment.  Randa will contact her aunt to obtain more information.    Her father had pancreatic cancer in his 60's and  at age 63 of a heart attack.     Randa has a family history of heart problems on the paternal side of the family.    Her maternal ethnicity is Vietnamese. Her paternal ethnicity is Hebrew.  There is no known Ashkenazi Shinto ancestry on either side of her family.     Discussion:    Randa's personal history of ovarian and endometrial cancer at age and young and family history of breast, ovarian, uterine and colon cancer is suggestive of a hereditary cancer syndrome.    We reviewed the features of sporadic, familial, and hereditary cancers.  In looking at Randa's family history, it is possible that a cancer susceptibility gene is present due to Teague syndrome and/or hereditary breast and ovarian cancer.    We discussed the natural history and genetics of Teague syndrome. We discussed that there are additional genes that could cause increased risk of hereditary cancer in her family.  As many of these genes present with overlapping features in a family, it would be reasonable for Randa to consider panel genetic testing to analyze multiple genes at once.    The most appropriate panel to test in Randa's situation based on her medical and family history is the OvaNext panel.  Genetic testing is available for 25 genes associated with hereditary gynecologic cancers: OvaNext (NEEMA, BARD1, BRCA1, BRCA2, BRIP1, CDH1, CHEK2, DICER1, EPCAM, MLH1, MRE11A, MSH2, MSH6, MUTYH, NBN, NF1, PALB2, PMS2, PTEN, RAD50, RAD51C, RAD51D, SMARCA4, STK11, and TP53).  We discussed that some of the genes in the OvaNext panel are associated with specific hereditary cancer syndromes and have published management guidelines: Hereditary Breast and Ovarian Cancer syndrome (BRCA1, BRCA2), Teague syndrome (MLH1, MSH2, MSH6, PMS2,  EPCAM), Hereditary Diffuse Gastric Cancer (CDH1), Cowden syndrome (PTEN), Li Fraumeni syndrome (TP53), Peutz-Jeghers syndrome (STK11), MUTYH Associated Polyposis syndrome (MUTYH), and Neurofibromatosis type 1 (NF1).    Risk-reducing salpingo-oophorectomy can be considered in women with mutations in BRIP1, RAD51C, or RAD51D.  Breast cancer risk management guidelines are available for NEEMA, CHEK2, PALB2, NF1, and NBN.  The remaining genes (BARD1, DICER1, MRE11A, RAD50, and SMARCA4) are associated with increased cancer risk and may allow us to make medical recommendations when mutations are identified.    A detailed handout regarding hereditary cancer and the information we discussed was provided to Randa at the end of our appointment today and can be found in the after visit summary. This also included a list of the genes in the OvaNext Panel and the associated cancers.    Topics included: inheritance pattern, cancer risks, cancer screening recommendations, and also risks, benefits and limitations of testing.  Consent was obtained and genetic [facility] OvaNext [facility]. Turn around time: approximately 4 weeks.    Based on her personal history of ovarian cancer and family history of ovarian and breast cancer, Randa meets current National Comprehensive Cancer Network (NCCN) criteria for genetic testing of the BRCA1 and BRCA2 genes .      Medical Management: The information from genetic testing may determine additional cancer screening recommendations (i.e. mammogram and breast MRI, more frequent colonoscopies, annual dermatologic exams, etc.) as well as options for risk reducing surgeries (i.e. bilateral mastectomy ) for Randa and her relatives.  These recommendations will be discussed in detail once genetic testing is completed.    Plan:  1. Today Randa elected to proceed with the OvaNext panel for hereditary ovarian cancer.  2. Randa will have her mother sign a medical release  form for [facility] to see if we can obtain genetic test results on her brother who  of colon cancer.  3. Randa will contact her maternal aunt about the family study done in [place].  4.  The information from the genetic test  should be available in 4 weeks.  5. Randa will return to clinic to discuss the results    Face to face time: 45 minutes    Magi Alvarez MS [facility]  Genetic Counselor  [phone]     negative details…

## 2024-01-12 NOTE — H&P PST ADULT - HISTORY OF PRESENT ILLNESS
43 year old female with PMH hypothyroidism, thyroid nodule (monitored by endo yearly - stable) reports c/o heavy menstrual cycle for about 4 years s/p D&C and polypectomy (10/2022) with recurrence of polyp. Pt now presents to PST for scheduled D&C, operative hysteroscopy and removal of endometrial polyp with aveta on 1/24/2024  43 year old female with PMH hypothyroidism, thyroid nodule (monitored by endo yearly - stable) reports c/o heavy menstrual cycle for about 4 years s/p D&C and polypectomy (10/2022) with recurrence of polyp on imaging. Pt now presents to PST for scheduled D&C, operative hysteroscopy and removal of endometrial polyp with aveta on 1/24/2024 at the ambulatory care center.

## 2024-01-12 NOTE — H&P PST ADULT - PRIMARY CARE PROVIDER
Dr. Jessica Crawford (Endo/PCP) [phone] last visit 1 year ago for CPE Dr. Jessica Crawford (Endo/PCP) [phone] last visit 1 year ago for CPE Dr. Jessica Crawford (Endo/PCP) [phone] last visit 1 year ago for CPE

## 2024-01-23 ENCOUNTER — NON-APPOINTMENT (OUTPATIENT)
Age: 44
End: 2024-01-23

## 2024-01-24 ENCOUNTER — APPOINTMENT (OUTPATIENT)
Dept: OBGYN | Facility: HOSPITAL | Age: 44
End: 2024-01-24

## 2024-01-24 ENCOUNTER — APPOINTMENT (OUTPATIENT)
Dept: OBGYN | Facility: CLINIC | Age: 44
End: 2024-01-24

## 2024-01-24 PROBLEM — N84.0 POLYP OF CORPUS UTERI: Chronic | Status: ACTIVE | Noted: 2024-01-12

## 2024-01-24 PROBLEM — D50.9 IRON DEFICIENCY ANEMIA, UNSPECIFIED: Chronic | Status: ACTIVE | Noted: 2024-01-12

## 2024-01-24 PROBLEM — Z86.39 PERSONAL HISTORY OF OTHER ENDOCRINE, NUTRITIONAL AND METABOLIC DISEASE: Chronic | Status: ACTIVE | Noted: 2024-01-12

## 2024-04-12 ENCOUNTER — APPOINTMENT (OUTPATIENT)
Dept: OBGYN | Facility: CLINIC | Age: 44
End: 2024-04-12
Payer: COMMERCIAL

## 2024-04-12 VITALS
WEIGHT: 128 LBS | DIASTOLIC BLOOD PRESSURE: 85 MMHG | HEIGHT: 63 IN | BODY MASS INDEX: 22.68 KG/M2 | SYSTOLIC BLOOD PRESSURE: 133 MMHG

## 2024-04-12 DIAGNOSIS — Z01.419 ENCOUNTER FOR GYNECOLOGICAL EXAMINATION (GENERAL) (ROUTINE) W/OUT ABNORMAL FINDINGS: ICD-10-CM

## 2024-04-12 PROCEDURE — 82270 OCCULT BLOOD FECES: CPT

## 2024-04-12 PROCEDURE — 99459 PELVIC EXAMINATION: CPT

## 2024-04-12 PROCEDURE — 99396 PREV VISIT EST AGE 40-64: CPT

## 2024-04-12 NOTE — PLAN
[FreeTextEntry1] : Routine Gyn Exam: BSA, calcium, vitamin D and exercise d/w pt. Pap smear conducted w/ HPV. Rx given for mammogram and breast sonogram. Advised patient to schedule colonoscopy at 45.  Recurrent Polyp: Hysteroscopy polypectomy scheduled on 5/8 Plan for Mirena IUD placement  RTO in 1 year or PRN.

## 2024-04-12 NOTE — SIGNATURES
[TextEntry] : This note was authored by Surinder Jurado working as a scribe for Dr. Milena Calvo.   I, Dr. Milena Calvo, have reviewed the content of this note and confirm it is true and accurate. I personally performed the history and physical examination and made all the decisions. 04/12/2024

## 2024-04-12 NOTE — PHYSICAL EXAM
[Chaperone Present] : A chaperone was present in the examining room during all aspects of the physical examination [24250] : A chaperone was present during the pelvic exam. [Appropriately responsive] : appropriately responsive [Alert] : alert [No Acute Distress] : no acute distress [No Lymphadenopathy] : no lymphadenopathy [Regular Rate Rhythm] : regular rate rhythm [No Murmurs] : no murmurs [Clear to Auscultation B/L] : clear to auscultation bilaterally [Soft] : soft [Non-tender] : non-tender [Non-distended] : non-distended [No HSM] : No HSM [No Lesions] : no lesions [No Mass] : no mass [Oriented x3] : oriented x3 [Examination Of The Breasts] : a normal appearance [No Masses] : no breast masses were palpable [Labia Majora] : normal [Labia Minora] : normal [Normal] : normal [Uterine Adnexae] : normal [FreeTextEntry9] : Guaiac negative, no masses

## 2024-04-12 NOTE — HISTORY OF PRESENT ILLNESS
[Patient reported mammogram was normal] : Patient reported mammogram was normal [Patient reported breast sonogram was normal] : Patient reported breast sonogram was normal [Patient reported PAP Smear was normal] : Patient reported PAP Smear was normal [No] : Patient does not have concerns regarding sex [FreeTextEntry1] : 2024. OZ GIBBS 43-year-old female  LMP 24. She presents for an annual gyn exam.  She reports regular, monthly menses. She reports menses are heavy, but normal for her. She denies intermenstrual bleeding. Hysteroscopy polypectomy scheduled on .  She denies abdominal and pelvic pain. No abnormal vaginal bleeding, vaginal discharge, or vaginitis symptoms. She reports normal urination, no dysuria, no incontinence of urine. BM is normal per patient, no blood in stool or constipation/diarrhea.  BP repeated: 128/80. Followed by endocrinologist yearly No new medical conditions, medications, or surgeries since last visit.  PMH: hypothyroid, thyroid nodules, concussion 2022, asthma, MTHFR gene Meds: Synthroid, multivitamins ObHx: 2018 FT  8 lb 3 oz - boy Garland, 19 FT  9 lb 2 oz - boy Jozef, SAB x4 GynHx: endo polyp 10/2022, known para ovarian cyst - left ovary 2.6 cm. No Hx of STI, fibroids, ovarian cysts, abnl Paps, or pelvic infections. SurgHx: uterine polyp 2016, egg retrievals FamHx: parents w/ hyperlipidemia, mother w/ HTN and DM. No FHx of breast, ovarian, uterine, or colon cancer. 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 [TextBox_4] : Pelvic sono 9/23: 8.1 mm endometrium w/ known polyp, normal ovaries, R ovary w/ dominant simple 1.6 cm follicle, L simple paraovarian cyst measures larger than prior, R simple 2.3 cm paraovarian cyst [Mammogramdate] : 07/23 [TextBox_19] : BI-RADS2 [BreastSonogramDate] : 07/23 [TextBox_25] : BI-RADS2 [PapSmeardate] : 03/23 [TextBox_31] : NILM, HPV not detected

## 2024-04-15 LAB — HPV HIGH+LOW RISK DNA PNL CVX: NOT DETECTED

## 2024-04-19 LAB — CYTOLOGY CVX/VAG DOC THIN PREP: NORMAL

## 2024-04-26 ENCOUNTER — OUTPATIENT (OUTPATIENT)
Dept: OUTPATIENT SERVICES | Facility: HOSPITAL | Age: 44
LOS: 1 days | End: 2024-04-26
Payer: COMMERCIAL

## 2024-04-26 VITALS
TEMPERATURE: 98 F | HEIGHT: 64 IN | DIASTOLIC BLOOD PRESSURE: 74 MMHG | OXYGEN SATURATION: 100 % | RESPIRATION RATE: 12 BRPM | HEART RATE: 55 BPM | WEIGHT: 126.1 LBS | SYSTOLIC BLOOD PRESSURE: 113 MMHG

## 2024-04-26 DIAGNOSIS — Z98.890 OTHER SPECIFIED POSTPROCEDURAL STATES: Chronic | ICD-10-CM

## 2024-04-26 DIAGNOSIS — Z01.818 ENCOUNTER FOR OTHER PREPROCEDURAL EXAMINATION: ICD-10-CM

## 2024-04-26 DIAGNOSIS — N84.0 POLYP OF CORPUS UTERI: ICD-10-CM

## 2024-04-26 LAB
HCT VFR BLD CALC: 38.2 % — SIGNIFICANT CHANGE UP (ref 34.5–45)
HGB BLD-MCNC: 12.2 G/DL — SIGNIFICANT CHANGE UP (ref 11.5–15.5)
MCHC RBC-ENTMCNC: 27.1 PG — SIGNIFICANT CHANGE UP (ref 27–34)
MCHC RBC-ENTMCNC: 31.9 GM/DL — LOW (ref 32–36)
MCV RBC AUTO: 84.7 FL — SIGNIFICANT CHANGE UP (ref 80–100)
NRBC # BLD: 0 /100 WBCS — SIGNIFICANT CHANGE UP (ref 0–0)
PLATELET # BLD AUTO: 298 K/UL — SIGNIFICANT CHANGE UP (ref 150–400)
RBC # BLD: 4.51 M/UL — SIGNIFICANT CHANGE UP (ref 3.8–5.2)
RBC # FLD: 13.3 % — SIGNIFICANT CHANGE UP (ref 10.3–14.5)
WBC # BLD: 3.68 K/UL — LOW (ref 3.8–10.5)
WBC # FLD AUTO: 3.68 K/UL — LOW (ref 3.8–10.5)

## 2024-04-26 PROCEDURE — G0463: CPT

## 2024-04-26 PROCEDURE — 85027 COMPLETE CBC AUTOMATED: CPT

## 2024-04-26 PROCEDURE — 36415 COLL VENOUS BLD VENIPUNCTURE: CPT

## 2024-04-26 RX ORDER — LEVOTHYROXINE SODIUM 125 MCG
1 TABLET ORAL
Qty: 0 | Refills: 0 | DISCHARGE

## 2024-04-26 RX ORDER — SODIUM CHLORIDE 9 MG/ML
1000 INJECTION, SOLUTION INTRAVENOUS
Refills: 0 | Status: DISCONTINUED | OUTPATIENT
Start: 2024-05-08 | End: 2024-05-22

## 2024-04-26 RX ORDER — SODIUM CHLORIDE 9 MG/ML
3 INJECTION INTRAMUSCULAR; INTRAVENOUS; SUBCUTANEOUS EVERY 8 HOURS
Refills: 0 | Status: DISCONTINUED | OUTPATIENT
Start: 2024-05-08 | End: 2024-05-22

## 2024-04-26 RX ORDER — LIDOCAINE HCL 20 MG/ML
0.2 VIAL (ML) INJECTION ONCE
Refills: 0 | Status: DISCONTINUED | OUTPATIENT
Start: 2024-05-08 | End: 2024-05-22

## 2024-04-26 NOTE — H&P PST ADULT - HISTORY OF PRESENT ILLNESS
43 year old female with PMH hypothyroidism, thyroid nodule (monitored by endo yearly - stable) reports c/o heavy menstrual cycle for about 4 years s/p D&C and polypectomy (10/2022) with recurrence of polyp on imaging. Pt now presents to PST for scheduled D&C, operative hysteroscopy and removal of endometrial polyp with aveta on 5/8/24. Patient denies recent fever, chills, chest pain, SOB, palpitations, or recent exposure to COVID-19.

## 2024-04-26 NOTE — H&P PST ADULT - PAIN GOAL
Received fax from pharmacy requesting refill on pts medication(s). Pt was last seen in office on 6/29/2023  and has a follow up scheduled for Visit date not found. Will send request to  Dr. Kay Davis  for authorization.      Requested Prescriptions     Pending Prescriptions Disp Refills    promethazine-dextromethorphan (PROMETHAZINE-DM) 6.25-15 MG/5ML syrup [Pharmacy Med Name: promethazine-DM 6.25 mg-15 mg/5 mL oral syrup] 180 mL 0     Sig: TAKE 5ML BY MOUTH FOUR TIMES DAILY AS NEEDED FOR COUGH 2

## 2024-04-26 NOTE — H&P PST ADULT - ASSESSMENT
Activity: Able to walk 2-3 blocks, ADLs, Grocery Shopping, 1 Flight of Stairs, plays tennis weekly    DASI: 9.89    Mallampati: 2     Dental: Denies loose teeth or dentures.

## 2024-04-26 NOTE — H&P PST ADULT - PROBLEM SELECTOR PLAN 1
D&C, Operative Hysteroscopy, Removal of Endometrial Polyp with Aveta on 5/8/24.   CBC done today at [facility].   Pre op instructions provided and all questions answered.   UCG on DOS, specimen container provided.

## 2024-05-07 ENCOUNTER — TRANSCRIPTION ENCOUNTER (OUTPATIENT)
Age: 44
End: 2024-05-07

## 2024-05-08 ENCOUNTER — APPOINTMENT (OUTPATIENT)
Dept: OBGYN | Facility: CLINIC | Age: 44
End: 2024-05-08

## 2024-05-08 ENCOUNTER — APPOINTMENT (OUTPATIENT)
Dept: OBGYN | Facility: HOSPITAL | Age: 44
End: 2024-05-08
Payer: COMMERCIAL

## 2024-05-08 ENCOUNTER — RESULT REVIEW (OUTPATIENT)
Age: 44
End: 2024-05-08

## 2024-05-08 ENCOUNTER — OUTPATIENT (OUTPATIENT)
Dept: OUTPATIENT SERVICES | Facility: HOSPITAL | Age: 44
LOS: 1 days | End: 2024-05-08
Payer: COMMERCIAL

## 2024-05-08 ENCOUNTER — TRANSCRIPTION ENCOUNTER (OUTPATIENT)
Age: 44
End: 2024-05-08

## 2024-05-08 VITALS
TEMPERATURE: 98 F | HEART RATE: 60 BPM | DIASTOLIC BLOOD PRESSURE: 66 MMHG | RESPIRATION RATE: 12 BRPM | OXYGEN SATURATION: 100 % | SYSTOLIC BLOOD PRESSURE: 109 MMHG

## 2024-05-08 VITALS
SYSTOLIC BLOOD PRESSURE: 118 MMHG | TEMPERATURE: 97 F | DIASTOLIC BLOOD PRESSURE: 78 MMHG | OXYGEN SATURATION: 100 % | RESPIRATION RATE: 18 BRPM | HEIGHT: 64 IN | WEIGHT: 126.1 LBS | HEART RATE: 54 BPM

## 2024-05-08 DIAGNOSIS — Z98.890 OTHER SPECIFIED POSTPROCEDURAL STATES: Chronic | ICD-10-CM

## 2024-05-08 DIAGNOSIS — N84.0 POLYP OF CORPUS UTERI: ICD-10-CM

## 2024-05-08 PROCEDURE — ZZZZZ: CPT

## 2024-05-08 PROCEDURE — 88305 TISSUE EXAM BY PATHOLOGIST: CPT | Mod: 26

## 2024-05-08 PROCEDURE — 58300 INSERT INTRAUTERINE DEVICE: CPT

## 2024-05-08 PROCEDURE — C1782: CPT

## 2024-05-08 PROCEDURE — 58558 HYSTEROSCOPY BIOPSY: CPT

## 2024-05-08 PROCEDURE — 88305 TISSUE EXAM BY PATHOLOGIST: CPT

## 2024-05-08 DEVICE — AVETA SMOL RESECTING DEVICE 2.9MM: Type: IMPLANTABLE DEVICE | Status: FUNCTIONAL

## 2024-05-08 DEVICE — IUD MIRENA: Type: IMPLANTABLE DEVICE | Status: FUNCTIONAL

## 2024-05-08 RX ORDER — LIDOCAINE HCL 20 MG/ML
0.2 VIAL (ML) INJECTION ONCE
Refills: 0 | Status: COMPLETED | OUTPATIENT
Start: 2024-05-08 | End: 2024-05-08

## 2024-05-08 RX ORDER — LEVOTHYROXINE SODIUM 125 MCG
1 TABLET ORAL
Refills: 0 | DISCHARGE

## 2024-05-08 RX ORDER — OXYCODONE HYDROCHLORIDE 5 MG/1
5 TABLET ORAL ONCE
Refills: 0 | Status: DISCONTINUED | OUTPATIENT
Start: 2024-05-08 | End: 2024-05-08

## 2024-05-08 RX ORDER — ONDANSETRON 8 MG/1
4 TABLET, FILM COATED ORAL ONCE
Refills: 0 | Status: DISCONTINUED | OUTPATIENT
Start: 2024-05-08 | End: 2024-05-22

## 2024-05-08 RX ADMIN — SODIUM CHLORIDE 3 MILLILITER(S): 9 INJECTION INTRAMUSCULAR; INTRAVENOUS; SUBCUTANEOUS at 05:49

## 2024-05-08 RX ADMIN — SODIUM CHLORIDE 100 MILLILITER(S): 9 INJECTION, SOLUTION INTRAVENOUS at 05:57

## 2024-05-08 NOTE — BRIEF OPERATIVE NOTE - NSICDXBRIEFPROCEDURE_GEN_ALL_CORE_FT
PROCEDURES:  Exam, under anesthesia 08-May-2024 07:28:15  Mimi Stephens  Endocervical curettage 08-May-2024 07:28:16  Mimi Stephens  Dilation and curettage 08-May-2024 07:28:19  Mimi Stephens  Hysteroscopy with polypectomy of uterus 08-May-2024 07:28:23  Mimi Stephens  Hysteroscopic surgical procedure 08-May-2024 07:28:25  Mimi Stephens

## 2024-05-08 NOTE — ASU PATIENT PROFILE, ADULT - PAIN SCALE PREFERRED, PROFILE
Assessment/Plan:         Problem List Items Addressed This Visit        Respiratory    Mild intermittent asthma without complication     Under control  Continue current medication  We will re-evaluate at next office visit  Cardiovascular and Mediastinum    Chronic atrial fibrillation (HCC)     Under control  Continue current medication  We will re-evaluate at next office visit  Been followed by a cardiologist         Relevant Medications    diltiazem (CARDIZEM CD) 120 mg 24 hr capsule    Hypertension, essential     Under control  Continue current medication  We will re-evaluate at next office visit  Relevant Medications    diltiazem (CARDIZEM CD) 120 mg 24 hr capsule    Other Relevant Orders    CBC and differential    Comprehensive metabolic panel    UA w Reflex to Microscopic w Reflex to Culture       Genitourinary    Stage 3b chronic kidney disease ([facility] [address] )     Lab Results   Component Value Date    EGFR 41 01/13/2022    EGFR 31 01/12/2022    EGFR 40 12/21/2021    CREATININE 1 13 01/13/2022    CREATININE 1 43 (H) 01/12/2022    CREATININE 1 15 (H) 12/21/2021   creatinine and GFR stable we will reevaluate at next office visit           Relevant Orders    Comprehensive metabolic panel       Other    Mixed hyperlipidemia     Under control  Continue current medication  We will re-evaluate at next office visit  Relevant Orders    Lipid Panel with Direct LDL reflex    Chronic neck pain     Under control  Continue current medication  We will re-evaluate at next office visit             Vitamin D deficiency    Relevant Orders    Vitamin D 25 hydroxy      Other Visit Diagnoses     Encounter for immunization    -  Primary    Relevant Orders    influenza vaccine, high-dose, PF 0 7 mL (FLUZONE HIGH-DOSE) (Completed)    Primary hypertension        Relevant Medications    diltiazem (CARDIZEM CD) 120 mg 24 hr capsule            Subjective:      Patient ID: Nelda May [place] is a 80 y o  female  Patient here for review of chronic medical problems and review of the labs and imaging if it is applicable  Currently has no specific complaints other than mentioned in the review of systems  Denies chest pain, SOB, cough, abdominal pain, nausea, vomiting, fever, chills, lightheadedness, dizziness,headache, tingling or numbness  No bowel or bladder problem  The following portions of the patient's history were reviewed and updated as appropriate:   Past Medical History:  She has a past medical history of Anemia, Asthma, Atrial fibrillation ([address] ) (11/08/2019), CAD, CKD (chronic kidney disease), Current use of long term anticoagulation (11/08/2019), Degenerative joint disease, Elevated troponin (09/08/2021), Hyperlipidemia, Hypertension, essential (11/08/2019), ALYSSA (iron deficiency anemia), Osteopenia, Pacemaker, and TIA (transient ischemic attack) (11/08/2019)  ,  _______________________________________________________________________  Medical Problems:  does not have any pertinent problems on file ,  _______________________________________________________________________  Past Surgical History:   has a past surgical history that includes Cardiac pacemaker placement  ,  _______________________________________________________________________  Family History:  family history includes Other in her mother ,  _______________________________________________________________________  Social History:   reports that she has never smoked  She has never used smokeless tobacco  She reports current alcohol use  She reports that she does not use drugs  ,  _______________________________________________________________________  Allergies:  is allergic to silver sulfadiazine     _______________________________________________________________________  Current Outpatient Medications   Medication Sig Dispense Refill   • albuterol (PROVENTIL HFA,VENTOLIN HFA) 90 mcg/act inhaler Inhale 2 puffs every 4 (four) hours as needed for wheezing 18 g 0   • diltiazem (CARDIZEM CD) 120 mg 24 hr capsule Take 1 capsule (120 mg total) by mouth daily 90 capsule 0   • Eliquis 2 5 MG TAKE 1 TABLET BY MOUTH 2 TIMES DAILY 60 tablet 0   • gabapentin (NEURONTIN) 100 mg capsule TAKE 2 CAPSULES TWICE DAILY 360 capsule 0   • iron polysaccharides (FERREX) 150 mg capsule Take 150 mg by mouth daily     • lisinopril (ZESTRIL) 5 mg tablet Take 1 tablet (5 mg total) by mouth daily 90 tablet 0   • metoprolol succinate (TOPROL-XL) 100 mg 24 hr tablet Take 1 tablet (100 mg total) by mouth daily 90 tablet 0   • rosuvastatin (CRESTOR) 5 mg tablet TAKE 1 TABLET (5 MG TOTAL) BY MOUTH 2 (TWO) TIMES A WEEK ON TUESDAY AND SATURDAY 26 tablet 0   • traMADol (ULTRAM) 50 mg tablet Take 1 tablet (50 mg total) by mouth daily 90 tablet 0     No current facility-administered medications for this visit      _______________________________________________________________________  Review of Systems   Constitutional: Negative for chills, fatigue and fever  HENT: Positive for hearing loss  Negative for congestion, ear pain, rhinorrhea, sneezing and sore throat  Eyes: Negative for redness, itching and visual disturbance  Respiratory: Negative for cough, chest tightness and shortness of breath  Cardiovascular: Negative for chest pain, palpitations and leg swelling  Gastrointestinal: Negative for abdominal pain, blood in stool, diarrhea, nausea and vomiting  Endocrine: Negative for cold intolerance and heat intolerance  Genitourinary: Negative for dysuria, frequency and urgency  Musculoskeletal: Positive for arthralgias (Both knees) and gait problem  Negative for back pain and myalgias  Skin: Negative for color change and rash  Neurological: Negative for dizziness, weakness, light-headedness, numbness and headaches  Hematological: Does not bruise/bleed easily  Psychiatric/Behavioral: Negative for agitation, behavioral problems and confusion  Objective:  Vitals:    11/03/22 1457   BP: 110/70   BP Location: Left arm   Patient Position: Sitting   Cuff Size: Standard   Pulse: 70   Resp: 16   Temp: 98 6 °F (37 °C)   TempSrc: Temporal   SpO2: 97%   Weight: 50 kg (110 lb 3 2 oz)   Height: 5' 3" (1 6 m)     Body mass index is 19 52 kg/m²  Physical Exam  Vitals and nursing note reviewed  Constitutional:       General: She is not in acute distress  Appearance: Normal appearance  She is not ill-appearing, toxic-appearing or diaphoretic  HENT:      Head: Normocephalic and atraumatic  Right Ear: Tympanic membrane normal       Left Ear: Tympanic membrane normal       Ears:      Comments: Bilateral sensorineural hearing loss     Nose: Nose normal  No congestion  Mouth/Throat:      Mouth: Mucous membranes are moist    Eyes:      General: No scleral icterus  Right eye: No discharge  Left eye: No discharge  Extraocular Movements: Extraocular movements intact  Conjunctiva/sclera: Conjunctivae normal       Pupils: Pupils are equal, round, and reactive to light  Cardiovascular:      Rate and Rhythm: Normal rate and regular rhythm  Pulses: Normal pulses  Heart sounds: Normal heart sounds  No murmur heard  No gallop  Pulmonary:      Effort: Pulmonary effort is normal  No respiratory distress  Breath sounds: Normal breath sounds  No wheezing, rhonchi or rales  Abdominal:      General: Abdomen is flat  Bowel sounds are normal  There is no distension  Palpations: Abdomen is soft  Tenderness: There is no abdominal tenderness  There is no guarding  Musculoskeletal:         General: No swelling or tenderness  Cervical back: Normal range of motion and neck supple  No rigidity  Comments: Limited ROM both knees   Lymphadenopathy:      Cervical: No cervical adenopathy  Skin:     General: Skin is warm  Capillary Refill: Capillary refill takes 2 to 3 seconds        Coloration: Skin is not jaundiced  Findings: No bruising or rash  Neurological:      General: No focal deficit present  Mental Status: She is alert and oriented to person, place, and time  Mental status is at baseline  Gait: Gait abnormal (Ambulates with walker)     Psychiatric:         Mood and Affect: Mood normal  numerical 0-10

## 2024-05-08 NOTE — ASU DISCHARGE PLAN (ADULT/PEDIATRIC) - NURSING INSTRUCTIONS
OK to take Tylenol/Acetaminophen at 1PM TODAY (last dose @  7AM   in operating room)  for pain and every 6 hours after as needed. OK to take Motrin/Ibuprofen at 1:30PM TODAY (last dose @  7:30AM   in operating room) n for pain and every 6 hours after as needed.

## 2024-05-08 NOTE — BRIEF OPERATIVE NOTE - NSICDXBRIEFPOSTOP_GEN_ALL_CORE_FT
POST-OP DIAGNOSIS:  Endometrial polyp 08-May-2024 07:28:07  Mimi Stephens  Dysfunctional uterine bleeding 08-May-2024 07:28:09  Mimi Stephens

## 2024-05-08 NOTE — ASU PATIENT PROFILE, ADULT - CENTRAL VENOUS CATHETER
Instructions:  No changes to current medication regimen  Return to HF clinic in 1 week  Please call the clinic with worsening symptoms or any questions       Continue to follow these guidelines unless otherwise instructed by your doctor:  2000mg sodium diet with NO added salt to food  Drink no more than 64 ounces of fluid per day, total  Weigh yourself daily at the same time each day and keep a record of it  Report weight gain of 3 lbs in 1 day or 5 lbs or more in 1 week to heart failure clinic     CONTINUE TO TAKE YOUR MEDICATIONS AS PRESCRIBED   BRING YOUR MEDICATION LIST TO EACH VISIT     Please call the Heart Failure Clinic at: [phone] if you are unable to keep your appointment or if you have any questions or concerns.    
no

## 2024-05-08 NOTE — ASU DISCHARGE PLAN (ADULT/PEDIATRIC) - ASU DC SPECIAL INSTRUCTIONSFT
MD JOE RAMACHANDRAN MD MARINESE DORENE, MD CHUNG HETTY, MD HUNTER TIFFANY, MD AHAMED TARNIMA, MD  PHONE: [phone]  FAX: [phone]    POSTOPERATIVE INSTRUCTIONS FOR HYSTEROSCOPY, ENDOMETRIAL POLYP/FIBROID REMOVAL,D&C    After your surgery it is normal to experience:    •	Vaginal bleeding- can last 1-2 weeks and should not be heavier than a period. It may come and go and be red, brown or pink. Use pads, not tampons.  •	Cramping- Is common especially within the first 24 hours. This should be relieved by taking over the counter motrin, advil or Tylenol.  •	Watery discharge- can be seen for a day or two after hysteroscopy because some of the fluid that is put into the uterus during surgery may continue to leak out for a day or two.  •	Vaginal soreness/irritation- can occur in the first few days after surgery because of the instruments that were used in the vagina. Soreness can be treated with ice pack and irritation can be taken care of with an emollient such as balmex or aquaphor that you can put directly on the irritated area.    Restrictions: For 10-14 days after the surgery you should avoid the following:    •	Tampons  •	Sex  •	Vigorous gym exercise  •	Swimming  pools and tub baths  •	Wait a day or two before going back to work    Anesthesia Precautions:  For the next 12 hours do not:   •	drive a car,  •	 operate power tools or machinery,  •	drink alcohol, beer, or wine,   •	make important personal or business decisions    Diet:   •	Resume Regular diet but Progress diet slowly     Physician Notification- Warning signs to look out for  •	Heavy Vaginal Bleeding   •	Shortness of breath or chest pain  •	Severe Abdominal Pain  •	Persistent nausea and vomiting  •	Pain not relieved by medications  •	Fever greater than 100.5®F  •	Inability to tolerate liquids or foods  •	Unable to urinate after 8 hours    Discharge and Disposition  •	Discharge to home    Follow Up Care:  •	In the event that you develop a complication and you are unable to reach your own physician, you may contact:  [phone] or go to the nearest Emergency Room.   •	Please contact the office to make a follow up appointment [phone] MD JOE RAMACHANDRAN, MD ZEE FOX, MD ELAINE OSULLIVAN, MD ROXANNE ACOSTA, MD MONI AGUIRRE MD  PHONE: [phone]  FAX: [phone]  ******************************************************************************************   POSTOPERATIVE INSTRUCTIONS FOR HYSTEROSCOPY, ENDOMETRIAL POLYP/FIBROID REMOVAL,D&C  ******************************************************************************************   After your surgery it is normal to experience: Vaginal bleeding- can last 1-2 weeks and should not be heavier than a period. It may come and go and be red, brown or pink. Use pads, not tampons.  ******************************************************************************************   Cramping- Is common especially within the first 24 hours. This should be relieved by taking over the counter motrin, advil or Tylenol.  ******************************************************************************************   Watery discharge- can be seen for a day or two after hysteroscopy because some of the fluid that is put into the uterus during surgery may continue to leak out for a day or two.  ******************************************************************************************   Vaginal soreness/irritation- can occur in the first few days after surgery because of the instruments that were used in the vagina. Soreness can be treated with ice pack and irritation can be taken care of with an emollient such as balmex or aquaphor that you can put directly on the irritated area.  ******************************************************************************************   Restrictions: For 10-14 days after the surgery you should avoid the following: Tampons, Sex, Vigorous gym exercise, Swimming  pools and tub baths, Wait a day or two before going back to work  ******************************************************************************************   Diet: Resume Regular diet but Progress diet slowly   ******************************************************************************************   Physician Notification- Warning signs to look out for  •	Heavy Vaginal Bleeding   •	Shortness of breath or chest pain  •	Severe Abdominal Pain  •	Persistent nausea and vomiting  •	Pain not relieved by medications  •	Fever greater than 100.5®F  •	Inability to tolerate liquids or foods  •	Unable to urinate after 8 hours  ******************************************************************************************   Discharge and Disposition: Discharge to home  ******************************************************************************************   Follow Up Care: In the event that you develop a complication and you are unable to reach your own physician, you may contact:  [phone] or go to the nearest Emergency Room. Please contact the office to make a follow up appointment [phone]

## 2024-05-08 NOTE — BRIEF OPERATIVE NOTE - NSICDXBRIEFPREOP_GEN_ALL_CORE_FT
PRE-OP DIAGNOSIS:  Endometrial polyp 08-May-2024 07:28:01  Mimi Stephens  Dysfunctional uterine bleeding 08-May-2024 07:28:04  Mimi Stephens

## 2024-05-14 LAB — SURGICAL PATHOLOGY STUDY: SIGNIFICANT CHANGE UP

## 2024-05-30 ENCOUNTER — APPOINTMENT (OUTPATIENT)
Dept: OBGYN | Facility: CLINIC | Age: 44
End: 2024-05-30
Payer: COMMERCIAL

## 2024-05-30 VITALS — SYSTOLIC BLOOD PRESSURE: 114 MMHG | DIASTOLIC BLOOD PRESSURE: 73 MMHG

## 2024-05-30 DIAGNOSIS — N84.0 POLYP OF CORPUS UTERI: ICD-10-CM

## 2024-05-30 DIAGNOSIS — Z97.5 PRESENCE OF (INTRAUTERINE) CONTRACEPTIVE DEVICE: ICD-10-CM

## 2024-05-30 PROCEDURE — 99213 OFFICE O/P EST LOW 20 MIN: CPT | Mod: 25

## 2024-08-18 PROBLEM — R92.8 ABNORMAL FINDING ON BREAST IMAGING: Status: ACTIVE | Noted: 2024-08-18

## 2024-09-03 DIAGNOSIS — N63.0 UNSPECIFIED LUMP IN UNSPECIFIED BREAST: ICD-10-CM

## 2024-09-05 ENCOUNTER — NON-APPOINTMENT (OUTPATIENT)
Age: 44
End: 2024-09-05

## 2024-09-25 ENCOUNTER — NON-APPOINTMENT (OUTPATIENT)
Age: 44
End: 2024-09-25

## 2025-05-07 ENCOUNTER — NON-APPOINTMENT (OUTPATIENT)
Age: 45
End: 2025-05-07

## 2025-05-08 ENCOUNTER — APPOINTMENT (OUTPATIENT)
Dept: OBGYN | Facility: CLINIC | Age: 45
End: 2025-05-08
Payer: COMMERCIAL

## 2025-05-08 ENCOUNTER — NON-APPOINTMENT (OUTPATIENT)
Age: 45
End: 2025-05-08

## 2025-05-08 VITALS
WEIGHT: 135 LBS | SYSTOLIC BLOOD PRESSURE: 129 MMHG | HEIGHT: 63 IN | BODY MASS INDEX: 23.92 KG/M2 | DIASTOLIC BLOOD PRESSURE: 80 MMHG

## 2025-05-08 DIAGNOSIS — N39.3 STRESS INCONTINENCE (FEMALE) (MALE): ICD-10-CM

## 2025-05-08 DIAGNOSIS — Z97.5 PRESENCE OF (INTRAUTERINE) CONTRACEPTIVE DEVICE: ICD-10-CM

## 2025-05-08 DIAGNOSIS — R92.8 OTHER ABNORMAL AND INCONCLUSIVE FINDINGS ON DIAGNOSTIC IMAGING OF BREAST: ICD-10-CM

## 2025-05-08 DIAGNOSIS — Z01.419 ENCOUNTER FOR GYNECOLOGICAL EXAMINATION (GENERAL) (ROUTINE) W/OUT ABNORMAL FINDINGS: ICD-10-CM

## 2025-05-08 PROCEDURE — 99396 PREV VISIT EST AGE 40-64: CPT

## 2025-05-08 PROCEDURE — G0444 DEPRESSION SCREEN ANNUAL: CPT | Mod: 59

## 2025-05-08 PROCEDURE — 99459 PELVIC EXAMINATION: CPT

## 2025-05-08 PROCEDURE — 82270 OCCULT BLOOD FECES: CPT

## 2025-05-09 ENCOUNTER — APPOINTMENT (OUTPATIENT)
Dept: GASTROENTEROLOGY | Facility: CLINIC | Age: 45
End: 2025-05-09

## 2025-05-12 LAB — HPV HIGH+LOW RISK DNA PNL CVX: NOT DETECTED

## 2025-05-14 LAB — CYTOLOGY CVX/VAG DOC THIN PREP: NORMAL

## 2025-07-08 ENCOUNTER — TRANSCRIPTION ENCOUNTER (OUTPATIENT)
Age: 45
End: 2025-07-08

## 2025-07-09 ENCOUNTER — NON-APPOINTMENT (OUTPATIENT)
Age: 45
End: 2025-07-09

## 2025-08-28 ENCOUNTER — APPOINTMENT (OUTPATIENT)
Dept: OBGYN | Facility: CLINIC | Age: 45
End: 2025-08-28

## (undated) DEVICE — SOL IRR POUR NS 0.9% 500ML

## (undated) DEVICE — PACK LITHOTOMY

## (undated) DEVICE — AVETA FLUID MANAGEMENT ACCESSORY

## (undated) DEVICE — OS FINDER

## (undated) DEVICE — TUBING FLUID ADMINISTRATION SET PRIM 70"

## (undated) DEVICE — SPECIMEN CONTAINER 100ML

## (undated) DEVICE — DRAPE 1/2 SHEET 40X57"

## (undated) DEVICE — NSA-STRYKER VIDEO TOWER: Type: DURABLE MEDICAL EQUIPMENT

## (undated) DEVICE — DRAPE LIGHT HANDLE COVER (GREEN)

## (undated) DEVICE — PRESSURE INFUSOR BAG 1000ML

## (undated) DEVICE — WARMING BLANKET UPPER ADULT

## (undated) DEVICE — POSITIONER PATIENT SAFETY STRAP 3X60"

## (undated) DEVICE — AVETA FLUID MANAGEMENT ACCESSORY W CAP

## (undated) DEVICE — GOWN TRIMAX LG

## (undated) DEVICE — AVETA CORAL HYSTEROSCOPE 4.6MM DISP

## (undated) DEVICE — CURETTE ENDOMETRIAL GYNOSAMPLER 23.6CM

## (undated) DEVICE — GLV 6.5 PROTEXIS (WHITE)

## (undated) DEVICE — SOL INJ NS 0.9% 1000ML

## (undated) DEVICE — DRSG TELFA 3 X 8

## (undated) DEVICE — SOL IRR BAG NS 0.9% 3000ML